# Patient Record
Sex: FEMALE | Race: WHITE | NOT HISPANIC OR LATINO | Employment: OTHER | URBAN - METROPOLITAN AREA
[De-identification: names, ages, dates, MRNs, and addresses within clinical notes are randomized per-mention and may not be internally consistent; named-entity substitution may affect disease eponyms.]

---

## 2017-01-03 ENCOUNTER — GENERIC CONVERSION - ENCOUNTER (OUTPATIENT)
Dept: OTHER | Facility: OTHER | Age: 77
End: 2017-01-03

## 2017-01-31 ENCOUNTER — ALLSCRIPTS OFFICE VISIT (OUTPATIENT)
Dept: OTHER | Facility: OTHER | Age: 77
End: 2017-01-31

## 2017-03-07 ENCOUNTER — ALLSCRIPTS OFFICE VISIT (OUTPATIENT)
Dept: OTHER | Facility: OTHER | Age: 77
End: 2017-03-07

## 2017-03-07 ENCOUNTER — HOSPITAL ENCOUNTER (OUTPATIENT)
Dept: RADIOLOGY | Facility: CLINIC | Age: 77
Discharge: HOME | End: 2017-03-07
Payer: MEDICARE

## 2017-03-07 ENCOUNTER — TRANSCRIBE ORDERS (OUTPATIENT)
Dept: ADMINISTRATIVE | Facility: HOSPITAL | Age: 77
End: 2017-03-07

## 2017-03-07 DIAGNOSIS — M54.50 LOW BACK PAIN: ICD-10-CM

## 2017-03-07 DIAGNOSIS — S32.010A CLOSED WEDGE COMPRESSION FRACTURE OF FIRST LUMBAR VERTEBRA (HCC): ICD-10-CM

## 2017-03-07 PROCEDURE — 72110 X-RAY EXAM L-2 SPINE 4/>VWS: CPT

## 2017-03-11 ENCOUNTER — GENERIC CONVERSION - ENCOUNTER (OUTPATIENT)
Dept: OTHER | Facility: OTHER | Age: 77
End: 2017-03-11

## 2017-03-28 ENCOUNTER — GENERIC CONVERSION - ENCOUNTER (OUTPATIENT)
Dept: OTHER | Facility: OTHER | Age: 77
End: 2017-03-28

## 2017-04-19 ENCOUNTER — GENERIC CONVERSION - ENCOUNTER (OUTPATIENT)
Dept: OTHER | Facility: OTHER | Age: 77
End: 2017-04-19

## 2017-05-01 ENCOUNTER — GENERIC CONVERSION - ENCOUNTER (OUTPATIENT)
Dept: OTHER | Facility: OTHER | Age: 77
End: 2017-05-01

## 2017-05-12 ENCOUNTER — GENERIC CONVERSION - ENCOUNTER (OUTPATIENT)
Dept: OTHER | Facility: OTHER | Age: 77
End: 2017-05-12

## 2017-05-26 ENCOUNTER — GENERIC CONVERSION - ENCOUNTER (OUTPATIENT)
Dept: OTHER | Facility: OTHER | Age: 77
End: 2017-05-26

## 2017-06-06 ENCOUNTER — GENERIC CONVERSION - ENCOUNTER (OUTPATIENT)
Dept: OTHER | Facility: OTHER | Age: 77
End: 2017-06-06

## 2017-07-10 DIAGNOSIS — M81.0 AGE-RELATED OSTEOPOROSIS WITHOUT CURRENT PATHOLOGICAL FRACTURE: ICD-10-CM

## 2017-07-21 ENCOUNTER — GENERIC CONVERSION - ENCOUNTER (OUTPATIENT)
Dept: OTHER | Facility: OTHER | Age: 77
End: 2017-07-21

## 2017-09-05 ENCOUNTER — GENERIC CONVERSION - ENCOUNTER (OUTPATIENT)
Dept: OTHER | Facility: OTHER | Age: 77
End: 2017-09-05

## 2017-10-03 ENCOUNTER — ALLSCRIPTS OFFICE VISIT (OUTPATIENT)
Dept: OTHER | Facility: OTHER | Age: 77
End: 2017-10-03

## 2017-10-03 ENCOUNTER — GENERIC CONVERSION - ENCOUNTER (OUTPATIENT)
Dept: OTHER | Facility: OTHER | Age: 77
End: 2017-10-03

## 2017-10-03 DIAGNOSIS — I10 ESSENTIAL (PRIMARY) HYPERTENSION: ICD-10-CM

## 2017-10-03 DIAGNOSIS — I73.9 PERIPHERAL VASCULAR DISEASE (HCC): ICD-10-CM

## 2017-10-03 DIAGNOSIS — R23.0 CYANOSIS: ICD-10-CM

## 2017-10-05 ENCOUNTER — LAB CONVERSION - ENCOUNTER (OUTPATIENT)
Dept: OTHER | Facility: OTHER | Age: 77
End: 2017-10-05

## 2017-10-05 LAB
A/G RATIO (HISTORICAL): 1.4 (CALC) (ref 1–2.5)
ALBUMIN SERPL BCP-MCNC: 4.4 G/DL (ref 3.6–5.1)
ALP SERPL-CCNC: 46 U/L (ref 33–130)
ALT SERPL W P-5'-P-CCNC: 9 U/L (ref 6–29)
AST SERPL W P-5'-P-CCNC: 19 U/L (ref 10–35)
BASOPHILS # BLD AUTO: 0.5 %
BASOPHILS # BLD AUTO: 30 CELLS/UL (ref 0–200)
BILIRUB SERPL-MCNC: 0.6 MG/DL (ref 0.2–1.2)
BUN SERPL-MCNC: 17 MG/DL (ref 7–25)
BUN/CREA RATIO (HISTORICAL): ABNORMAL (CALC) (ref 6–22)
CALCIUM SERPL-MCNC: 9.4 MG/DL (ref 8.6–10.4)
CHLORIDE SERPL-SCNC: 98 MMOL/L (ref 98–110)
CHOLEST SERPL-MCNC: 191 MG/DL
CHOLEST/HDLC SERPL: 2.4 (CALC)
CO2 SERPL-SCNC: 28 MMOL/L (ref 20–31)
CREAT SERPL-MCNC: 0.75 MG/DL (ref 0.6–0.93)
DEPRECATED RDW RBC AUTO: 12.9 % (ref 11–15)
EGFR AFRICAN AMERICAN (HISTORICAL): 88 ML/MIN/1.73M2
EGFR-AMERICAN CALC (HISTORICAL): 76 ML/MIN/1.73M2
EOSINOPHIL # BLD AUTO: 1.5 %
EOSINOPHIL # BLD AUTO: 90 CELLS/UL (ref 15–500)
FOLATE SERPL-MCNC: >24 NG/ML
GAMMA GLOBULIN (HISTORICAL): 3.1 G/DL (CALC) (ref 1.9–3.7)
GLUCOSE (HISTORICAL): 105 MG/DL (ref 65–99)
HBA1C MFR BLD HPLC: 5.6 % OF TOTAL HGB
HCT VFR BLD AUTO: 37.7 % (ref 35–45)
HDLC SERPL-MCNC: 80 MG/DL
HGB BLD-MCNC: 12.7 G/DL (ref 11.7–15.5)
LDL CHOLESTEROL (HISTORICAL): 93 MG/DL (CALC)
LDL CHOLESTEROL DIRECT (HISTORICAL): 82 MG/DL
LYMPHOCYTES # BLD AUTO: 1038 CELLS/UL (ref 850–3900)
LYMPHOCYTES # BLD AUTO: 17.3 %
MCH RBC QN AUTO: 30.4 PG (ref 27–33)
MCHC RBC AUTO-ENTMCNC: 33.7 G/DL (ref 32–36)
MCV RBC AUTO: 90.2 FL (ref 80–100)
MONOCYTES # BLD AUTO: 504 CELLS/UL (ref 200–950)
MONOCYTES (HISTORICAL): 8.4 %
NEUTROPHILS # BLD AUTO: 4338 CELLS/UL (ref 1500–7800)
NEUTROPHILS # BLD AUTO: 72.3 %
NON-HDL-CHOL (CHOL-HDL) (HISTORICAL): 111 MG/DL (CALC)
PLATELET # BLD AUTO: 264 THOUSAND/UL (ref 140–400)
PMV BLD AUTO: 10 FL (ref 7.5–12.5)
POTASSIUM SERPL-SCNC: 4 MMOL/L (ref 3.5–5.3)
RBC # BLD AUTO: 4.18 MILLION/UL (ref 3.8–5.1)
SODIUM SERPL-SCNC: 134 MMOL/L (ref 135–146)
T4 FREE SERPL-MCNC: 1.5 NG/DL (ref 0.8–1.8)
TOTAL PROTEIN (HISTORICAL): 7.5 G/DL (ref 6.1–8.1)
TRIGL SERPL-MCNC: 86 MG/DL
TSH SERPL DL<=0.05 MIU/L-ACNC: 1.41 MIU/L (ref 0.4–4.5)
VIT B12 SERPL-MCNC: 693 PG/ML (ref 200–1100)
WBC # BLD AUTO: 6 THOUSAND/UL (ref 3.8–10.8)

## 2017-11-02 ENCOUNTER — GENERIC CONVERSION - ENCOUNTER (OUTPATIENT)
Dept: OTHER | Facility: OTHER | Age: 77
End: 2017-11-02

## 2018-01-01 ENCOUNTER — TELEPHONE (OUTPATIENT)
Dept: FAMILY MEDICINE | Facility: CLINIC | Age: 78
End: 2018-01-01

## 2018-01-01 ENCOUNTER — OFFICE VISIT (OUTPATIENT)
Dept: FAMILY MEDICINE | Facility: CLINIC | Age: 78
End: 2018-01-01
Payer: MEDICARE

## 2018-01-01 ENCOUNTER — PATIENT OUTREACH (OUTPATIENT)
Dept: CASE MANAGEMENT | Facility: CLINIC | Age: 78
End: 2018-01-01

## 2018-01-01 VITALS
TEMPERATURE: 98.1 F | BODY MASS INDEX: 21.01 KG/M2 | HEART RATE: 72 BPM | DIASTOLIC BLOOD PRESSURE: 64 MMHG | OXYGEN SATURATION: 95 % | WEIGHT: 104 LBS | SYSTOLIC BLOOD PRESSURE: 128 MMHG

## 2018-01-01 VITALS
SYSTOLIC BLOOD PRESSURE: 160 MMHG | TEMPERATURE: 98.1 F | WEIGHT: 105 LBS | DIASTOLIC BLOOD PRESSURE: 70 MMHG | BODY MASS INDEX: 21.21 KG/M2 | OXYGEN SATURATION: 92 %

## 2018-01-01 VITALS
OXYGEN SATURATION: 98 % | SYSTOLIC BLOOD PRESSURE: 142 MMHG | TEMPERATURE: 98.1 F | HEIGHT: 59 IN | BODY MASS INDEX: 21.17 KG/M2 | HEART RATE: 79 BPM | DIASTOLIC BLOOD PRESSURE: 70 MMHG | WEIGHT: 105 LBS

## 2018-01-01 DIAGNOSIS — Z09 HOSPITAL DISCHARGE FOLLOW-UP: Primary | ICD-10-CM

## 2018-01-01 DIAGNOSIS — F32.89 OTHER DEPRESSION: Primary | ICD-10-CM

## 2018-01-01 DIAGNOSIS — R26.89 BALANCE DISORDER: ICD-10-CM

## 2018-01-01 DIAGNOSIS — Z87.81 HISTORY OF RIB FRACTURE: ICD-10-CM

## 2018-01-01 DIAGNOSIS — G20.A1 PARKINSON'S DISEASE (CMS/HCC): ICD-10-CM

## 2018-01-01 DIAGNOSIS — R41.3 MEMORY LOSS: ICD-10-CM

## 2018-01-01 DIAGNOSIS — R60.0 LOWER EXTREMITY EDEMA: ICD-10-CM

## 2018-01-01 DIAGNOSIS — I10 ESSENTIAL HYPERTENSION: ICD-10-CM

## 2018-01-01 DIAGNOSIS — S27.0XXA TRAUMATIC PNEUMOTHORAX, INITIAL ENCOUNTER: ICD-10-CM

## 2018-01-01 DIAGNOSIS — R41.0 CONFUSION: Primary | ICD-10-CM

## 2018-01-01 DIAGNOSIS — R06.02 SOB (SHORTNESS OF BREATH): ICD-10-CM

## 2018-01-01 PROCEDURE — 99202 OFFICE O/P NEW SF 15 MIN: CPT | Performed by: FAMILY MEDICINE

## 2018-01-01 PROCEDURE — 99213 OFFICE O/P EST LOW 20 MIN: CPT | Performed by: FAMILY MEDICINE

## 2018-01-01 PROCEDURE — 99495 TRANSJ CARE MGMT MOD F2F 14D: CPT | Performed by: FAMILY MEDICINE

## 2018-01-01 RX ORDER — BUPROPION HYDROCHLORIDE 150 MG/1
150 TABLET, EXTENDED RELEASE ORAL
COMMUNITY
End: 2018-01-01 | Stop reason: SDUPTHER

## 2018-01-01 RX ORDER — ESCITALOPRAM OXALATE 10 MG/1
20 TABLET ORAL
COMMUNITY
Start: 2017-01-31 | End: 2018-01-01 | Stop reason: SDUPTHER

## 2018-01-01 RX ORDER — CARBIDOPA AND LEVODOPA 25; 100 MG/1; MG/1
1 TABLET, EXTENDED RELEASE ORAL
COMMUNITY
End: 2018-01-01 | Stop reason: SDUPTHER

## 2018-01-01 RX ORDER — PSYLLIUM HUSK 0.4 G
CAPSULE ORAL DAILY
COMMUNITY

## 2018-01-01 RX ORDER — ATENOLOL 25 MG/1
1 TABLET ORAL
COMMUNITY

## 2018-01-01 RX ORDER — ESCITALOPRAM OXALATE 20 MG/1
20 TABLET ORAL DAILY
Start: 2018-01-01

## 2018-01-01 RX ORDER — CHOLECALCIFEROL (VITAMIN D3) 25 MCG
1000 TABLET ORAL DAILY
COMMUNITY

## 2018-01-01 RX ORDER — BUPROPION HYDROCHLORIDE 200 MG/1
200 TABLET, EXTENDED RELEASE ORAL DAILY
Start: 2018-01-01

## 2018-01-01 RX ORDER — MEMANTINE HYDROCHLORIDE 10 MG/1
1 TABLET ORAL
COMMUNITY
Start: 2016-08-29

## 2018-01-01 RX ORDER — CARBIDOPA AND LEVODOPA 25; 100 MG/1; MG/1
1 TABLET, EXTENDED RELEASE ORAL 4 TIMES DAILY
Start: 2018-01-01

## 2018-01-01 ASSESSMENT — ENCOUNTER SYMPTOMS
SHORTNESS OF BREATH: 1
APPETITE CHANGE: 0
DIARRHEA: 0
BACK PAIN: 0
NERVOUS/ANXIOUS: 0
APPETITE CHANGE: 0
DIZZINESS: 0
ACTIVITY CHANGE: 0
FATIGUE: 1
PALPITATIONS: 0
COUGH: 0
WEAKNESS: 1
ABDOMINAL PAIN: 0
PALPITATIONS: 0
ABDOMINAL DISTENTION: 0
FATIGUE: 1
WHEEZING: 0
FREQUENCY: 0
COUGH: 0
DIZZINESS: 0
SHORTNESS OF BREATH: 0
SLEEP DISTURBANCE: 0
NUMBNESS: 0
DIARRHEA: 0
VOMITING: 0
VOMITING: 0
CONSTIPATION: 0
COUGH: 0
CONSTIPATION: 0
ABDOMINAL DISTENTION: 0
NAUSEA: 0
WEAKNESS: 0
DIZZINESS: 0
FATIGUE: 1
LIGHT-HEADEDNESS: 0
APPETITE CHANGE: 0
DIARRHEA: 0
CONFUSION: 1
FREQUENCY: 0
ACTIVITY CHANGE: 0
NAUSEA: 0
HEADACHES: 0
WHEEZING: 1
ABDOMINAL PAIN: 0
LIGHT-HEADEDNESS: 0
SLEEP DISTURBANCE: 0
NUMBNESS: 0
WHEEZING: 0
DYSPHORIC MOOD: 1
DIFFICULTY URINATING: 0
DIFFICULTY URINATING: 0
SHORTNESS OF BREATH: 0
VOMITING: 0
ABDOMINAL PAIN: 0
NAUSEA: 0

## 2018-01-09 NOTE — PROGRESS NOTES
Assessment    1  Essential hypertension (401 9) (I10)   2  Lyme disease (088 81) (A69 20)   3  GERD (gastroesophageal reflux disease) (530 81) (K21 9)   4  Memory loss (780 93) (R41 3)   5  Parkinson's disease (332 0) (G20)   6  Lightheadedness (780 4) (R42)    Plan  Essential hypertension    · Follow-up visit in 2 months Evaluation and Treatment  Follow-up  Status: Hold For -  Scheduling  Requested for: 70MQP5450  Lightheadedness, Memory loss, Parkinson's disease    · * MRI BRAIN W WO CONTRAST; Status:Active; Requested for:08Mar2016;    · VAS CAROTID COMPLETE STUDY; SIDE:Bilateral; Status:Active; Requested  for:08Mar2016;   Lyme disease    · Doxycycline Hyclate 100 MG Oral Capsule; Take 1 capsule twice daily   · Epi - Mark Vo MD, Ruperto Perez  (Internal Medicine) Physician Referral  Consult  Status: Active   Requested for: 87FBC2700  are Referring to a non-SL Preferred Provider : Quality  Care Summary provided  : Yes  Memory loss    · Namenda Titration Chriss 5 (28)-10 (21) MG Oral Tablet; Take as directed    Discussion/Summary    The patient's Lyme titer was highly positive consistent with chronic Lyme  I will restart her on the doxycycline as ordered to help with her symptoms  I am going to refer her to [facility] infectious disease for further evaluation and treatment  We will monitor her very closely  In regards to her memory loss and lightheadedness, I will send her for the MRI and the carotid ultrasound was ordered  She'll follow up with her neurologist as scheduled  We will monitor her very closely and we'll see her back as scheduled  Possible side effects of new medications were reviewed with the patient/guardian today  The treatment plan was reviewed with the patient/guardian  The patient/guardian understands and agrees with the treatment plan      Chief Complaint  patient follow up visit, she states she is still feeling bad and would like to discuss her symptoms        History of Present Illness  Ab Murcia is a 66 y/o female who presents for a follow up of her chronic conditions  She states she is having lightheadedness and nausea this am  She has been feeling lightheaded for 2 -3 weeks  There are no fevers or chills  There are no headaches  There are no palpitations  There is no edema  She is seeing cardiology next week  She is seeing the Neurologist in a few months  She is taking the Pantaprozole for her GERD and it is working well  Her last IV infusion of antibiotics was years ago for Lyme  There is no joint pain and swelling  There are no rashes  There are no headaches  There is no spinning sensation and it does not feel like the room is spinning  There is no blood in her stool  There is no relation to change in position  There is no shortness of breath  There is discomfort when she breathes out in the midepigastric area for a few months  He denies any joint pain or swelling  She denies any fevers  Symptoms:  general malaise, fatigue and myalgias, but no localized rash, no arthralgias, no joint swelling, no fever, no headache and no facial drooping  Associated symptoms:  memory loss, but no mood changes, no confusion, no localized weakness, no paresthesias, no shortness of breath, no palpitations, no syncope, no chest pain, no testicular pain and no inflammation of the eyes  The patient is being seen for a routine clinic follow-up of Parkinson's disease  Symptoms:  tremor at rest, pill-rolling hand tremor and gait disturbance, but no frequent falls, no postural instability, no decreased blinking, no bradykinesia, no paucity of movement, no akathisia, no limb clumsiness, no decreased facial expression, no altered speech and no worsening handwriting  The patient is being seen for follow-up of gastroesophageal reflux disease  The patient reports doing well  There are no comorbid illnesses  She has had no significant interval events  The patient is currently asymptomatic     The patient is being seen for follow-up of memory loss  The patient reports no change in the condition  She has had no significant interval events  Interval symptoms:  stable memory loss, stable difficulty performing familiar tasks, stable confusion, stable agitation, stable aggressiveness, stable difficulty concentrating, stable impaired judgment and stable   The patient presents for follow-up of essential hypertension  The patient states she has been doing well with her blood pressure control since the last visit  She has no comorbid illnesses  She has no significant interval events  Symptoms: The patient is currently asymptomatic  Review of Systems    Constitutional: as noted in HPI  Eyes: as noted in HPI    ENT: as noted in HPI  Cardiovascular: as noted in HPI  Respiratory: as noted in HPI  Gastrointestinal: as noted in HPI  Genitourinary: as noted in HPI  Musculoskeletal: as noted in HPI  Integumentary: as noted in HPI  Neurological: as noted in HPI  Psychiatric: as noted in HPI  Active Problems    1  Acid reflux (530 81) (K21 9)   2  Cerebrovascular disease (437 9) (I67 9)   3  Chronic fatigue (780 79) (R53 82)   4  Dizziness and giddiness (780 4) (R42)   5  Essential hypertension (401 9) (I10)   6  Lightheadedness (780 4) (R42)   7  Lyme disease (088 81) (A69 20)   8  Memory loss (780 93) (R41 3)   9  Palpitations (785 1) (R00 2)   10  Parkinson's disease (332 0) (G20)   11  Screening for breast cancer (V76 10) (Z12 39)   12  Screening for colon cancer (V76 51) (Z12 11)    Past Medical History    1  History of benign neoplasm of kidney, 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)   2  History of carcinoma in situ of cervix (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 89) (Z86 008)   3  History of depression (V11 8) (Z86 59)   4  History of hypoglycemia (V12 29) (Z86 39)   5  History of irritable bowel syndrome (V12 79) (Z87 19)   6  History of osteoporosis (V13 59) (Z87 39)   7  History of Lung nodule (793 11) (R91 1)   8  History of Palpitations (785 1) (R00 2)   9  History of Vitamin D deficiency (268 9) (E55 9)    The active problems and past medical history were reviewed and updated today  Surgical History    1  History of Cholecystectomy   2  History of Hysterectomy    The surgical history was reviewed and updated today  Family History    1  No pertinent family history    2  Family history of myocardial infarction (V17 3) (Z82 49)    3  Family history of Adult hypothyroidism    4  Family history of Bilateral malignant neoplasm of breast in female, unspecified site of   breast    The family history was reviewed and updated today  Social History    ·    · Never a smoker   · No alcohol use   · No drug use   · Three children  The social history was reviewed and updated today  The social history was reviewed and is unchanged  Current Meds   1  Atenolol 25 MG Oral Tablet; TAKE 1 TABLET 3 times daily; Therapy: (Recorded:67Dlr8555) to Recorded   2  Carbidopa-Levodopa ER  MG Oral Tablet Extended Release; TAKE 1 TABLET 4   TIMES DAILY; Therapy: (Recorded:08Mar2016) to Recorded   3  ClonazePAM 0 5 MG Oral Tablet; TAKE 1 TABLET Daily PRN anxiety; Therapy: (Recorded:50Uyl6376) to Recorded   4  Pantoprazole Sodium TBEC; Take 1 tablet daily; Therapy: (Recorded:72Sex1951) to Recorded    The medication list was reviewed and updated today  Allergies    1  No Known Drug Allergies    Vitals  Vital Signs [Data Includes: Current Encounter]    Recorded: 94CWN6839 11:39AM   Temperature 97 7 F, Tympanic   Heart Rate 66   Systolic 107, RUE, Sitting   Diastolic 80, RUE, Sitting   Height 5 ft 1 in   Weight 115 lb 7 04 oz   BMI Calculated 21 81   BSA Calculated 1 49     Physical Exam    Constitutional   General appearance: No acute distress, well appearing and well nourished  Eyes   Conjunctiva and lids: No swelling, erythema or discharge  Pupils and irises: Equal, round and reactive to light      Ears, Nose, Mouth, and Throat   External inspection of ears and nose: Normal     Otoscopic examination: Tympanic membranes translucent with normal light reflex  Canals patent without erythema  Nasal mucosa, septum, and turbinates: Normal without edema or erythema  Oropharynx: Normal with no erythema, edema, exudate or lesions  Pulmonary   Respiratory effort: No increased work of breathing or signs of respiratory distress  Auscultation of lungs: Clear to auscultation  Cardiovascular   Palpation of heart: Normal PMI, no thrills  Auscultation of heart: Normal rate and rhythm, normal S1 and S2, without murmurs  Examination of extremities for edema and/or varicosities: Normal     Carotid pulses: Normal     Abdomen   Abdomen: Non-tender, no masses  Liver and spleen: No hepatomegaly or splenomegaly  Lymphatic   Palpation of lymph nodes in neck: No lymphadenopathy  Musculoskeletal   Gait and station: Normal     Digits and nails: Normal without clubbing or cyanosis  Inspection/palpation of joints, bones, and muscles: Normal     Skin   Skin and subcutaneous tissue: Normal without rashes or lesions  Neurologic   Cranial nerves: Cranial nerves 2-12 intact  Reflexes: 2+ and symmetric  Sensation: No sensory loss  Psychiatric   Orientation to person, place, and time: Normal     Mood and affect: Normal          Health Management  Screening for colon cancer   COLONOSCOPY; every 5 years; Last 29HOX2143; Next Due: 49QZB1053;  Overdue    Signatures   Electronically signed by : David Johnson DO; Mar  8 2016  9:32PM EST                       (Author)

## 2018-01-10 NOTE — MISCELLANEOUS
Message   Recorded as Task   Date: 03/25/2016 08:55 AM, Created By: Oh Dang   Task Name: Go to Note   Assigned To: Zaki Hayeseste   Regarding Patient: Arlyn Ray, Status: Active   Comment:    Oh Dang - 25 Mar 2016 8:55 AM     TASK CREATED  Let Dasia know that there were no acute findings on the MRI  There are no brain lesions  They saw evidnce of s skin lesion on the posterior scalp that my be nothing, but I should examine this in the office  Please see if she could come in next week so I can check this    03/25/2016 Spoke to patient and advised to schedule an appointment  trb      Active Problems    1  Acid reflux (530 81) (K21 9)   2  Cerebrovascular disease (437 9) (I67 9)   3  Chronic fatigue (780 79) (R53 82)   4  Dizziness and giddiness (780 4) (R42)   5  Essential hypertension (401 9) (I10)   6  GERD (gastroesophageal reflux disease) (530 81) (K21 9)   7  Lightheadedness (780 4) (R42)   8  Lyme disease (088 81) (A69 20)   9  Memory loss (780 93) (R41 3)   10  Osteoporosis (733 00) (M81 0)   11  Palpitations (785 1) (R00 2)   12  Parkinson's disease (332 0) (G20)    Current Meds   1  Atenolol 25 MG Oral Tablet; TAKE 1 TABLET 3 times daily; Therapy: (Recorded:70Bym9961) to Recorded   2  Carbidopa-Levodopa ER  MG Oral Tablet Extended Release; TAKE 1 TABLET 4   TIMES DAILY; Therapy: (Recorded:08Mar2016) to Recorded   3  ClonazePAM 0 5 MG Oral Tablet; TAKE 1 TABLET Daily PRN anxiety; Therapy: (Recorded:38Rvj3109) to Recorded   4  Doxycycline Hyclate 100 MG Oral Capsule; Take 1 capsule twice daily; Therapy: 53QDL7787 to (Laurent Jaquez)  Requested for: 30CDZ3997; Last   Rx:08Mar2016 Ordered   5  Namenda Titration Chriss 5 (28)-10 (21) MG Oral Tablet; Take as directed; Therapy: 07WPK4095 to (Evaluate:05Apr2016); Last Rx:08Mar2016 Ordered   6  Pantoprazole Sodium TBEC; Take 1 tablet daily; Therapy: (Recorded:77Rig1621) to Recorded    Allergies    1   No Known Drug Allergies    Signatures Electronically signed by :  Rhea Palmer, ; Mar 25 2016  2:33PM EST                       (Author)

## 2018-01-10 NOTE — MISCELLANEOUS
Message   Recorded as Task   Date: 09/06/2016 03:23 PM, Created By: Praveen Valenzuela   Task Name: Care Coordination   Assigned To: Anna Morrison   Regarding Patient: Donell Caballero, Status: Active   CommentLenita Pica - 06 Sep 2016 3:23 PM     TASK CREATED  Caller: NAOMI, PT FROM [place], Other; Care Coordination; [phone]  NAOMI FROM [place] PHYSICAL [facility]ERAPY CALLED  IS DISCHARGING PT FROM THERAPY TODAY  DID MAKE SOME IMPROVEMENT WITH BALANCE, BUT NO IMPROVEMENT FOR LIGHT HEADEDNESS   THERAPY WAS LIMITED DUE TO LIGHT HEADEDNESS  CAN CALL NAOMI [phone] WITH ANY QUESTIONS   Priscilla Caldwell - 06 Sep 2016 4:50 PM     TASK REASSIGNED: Previously Assigned To Sathish Mancera      I would recommend that the patient should follow up with her Cardiologist about her lightheadedness- she should call and schedule a follow up with them- she may need her meds adjusted  Anna Morrison - 06 Sep 2016 7:55 PM     TASK EDITED  Patient advised  PLM        Active Problems    1  Acid reflux (530 81) (K21 9)   2  Cerebrovascular disease (437 9) (I67 9)   3  Chronic fatigue (780 79) (R53 82)   4  Dizziness and giddiness (780 4) (R42)   5  Essential hypertension (401 9) (I10)   6  Gait instability (781 2) (R26 81)   7  GERD (gastroesophageal reflux disease) (530 81) (K21 9)   8  Lightheadedness (780 4) (R42)   9  Lipoma of scalp (214 1) (D17 0)   10  Lyme disease (088 81) (A69 20)   11  Memory loss (780 93) (R41 3)   12  Muscular deconditioning (781 99) (R29 898)   13  Osteoporosis (733 00) (M81 0)   14  Palpitations (785 1) (R00 2)   15  Parkinson's disease (332 0) (G20)    Current Meds   1  Atenolol 25 MG Oral Tablet; TAKE 1 TABLET 3 times daily; Therapy: (Recorded:22Rce0539) to Recorded   2  Carbidopa-Levodopa ER  MG Oral Tablet Extended Release; TAKE 1 TABLET 4   TIMES DAILY; Therapy: (Recorded:08Mar2016) to Recorded   3  ClonazePAM 0 5 MG Oral Tablet; TAKE 1 TABLET Daily PRN anxiety;    Therapy: (Recorded:74Vke9529) to Recorded   4  ClonazePAM 1 MG Oral Tablet; Therapy: 22JRI4889 to (Evaluate:08Sep2016) Recorded   5  Memantine HCl - 5 MG Oral Tablet; 2 IN THE AM Recorded   6  Vitamin D CAPS; One daily; Therapy: (Recorded:10Jun2016) to Recorded   7  Zoledronic Acid 5 MG/100ML Intravenous Solution (Reclast); INFUSE 5MG   INTRAVENOUSLY OVER 15 MINUTES AS DIRECTED once yearly; Therapy: 01MNI8149 to (Evaluate:11Jun2016); Last Rx:10Jun2016 Ordered    Allergies    1   No Known Drug Allergies    Signatures   Electronically signed by : Mahesh Conrad, ; Sep  6 2016  7:55PM EST                       (Author)

## 2018-01-11 NOTE — PROGRESS NOTES
Assessment    1  Encounter for preventive health examination (V70 0) (Z00 00)    Plan  Depression    · BuPROPion HCl ER (XL) 150 MG Oral Tablet Extended Release 24 Hour  (Wellbutrin XL); TAKE 1 TABLET DAILY AS DIRECTED   · Follow-up visit in 1 month Evaluation and Treatment  Follow-up  Status: Complete  Done:  [id number]  Encounter for screening mammogram for breast cancer    · * MAMMO SCREENING BILATERAL W CAD; Status:Active; Requested for:55Igt6342; Health Maintenance    · Begin a limited exercise program ; Status:Complete;   Done: 63AEO4481   · Eat a low fat and low cholesterol diet ; Status:Complete;   Done: 93UYC9123   · Eat no more than 30 grams of fat per day ; Status:Complete;   Done: 99GTQ5341   · Stretch and warm up your muscles during the first 10 minutes , then cool down your  muscles for the last 10 minutes of exercise ; Status:Complete;   Done: 28EDU6238   · There are many exercise options for seniors ; Status:Complete;   Done: 16HSQ5950   · There ways to avoid falling ; Status:Complete;   Done: 10NMW9139   · These are things you can do to prevent falls in and around the home ; Status:Complete;    Done: 84TGS8589   · We encourage all of our patients to exercise regularly    30 minutes of exercise or physical  activity five or more days a week is recommended for children and adults ;  Status:Complete;   Done: 82KPV5463  Need for vaccination with 13-polyvalent pneumococcal conjugate vaccine    · Stop: Prevnar 13 Intramuscular Suspension  Need for vaccination with 13-polyvalent pneumococcal conjugate vaccine, Need for  Zostavax administration    · Stop: Zostavax 03743 UNT/0 65ML Subcutaneous Solution Reconstituted  Needs flu shot    · Stop: Fluzone High-Dose 0 5 ML Intramuscular Suspension Prefilled  Syringe  Palpitations    · From  Atenolol 25 MG Oral Tablet TAKE 1 TABLET 3 times daily To Atenolol 25  MG Oral Tablet TAKE 1 TABLET TWICE DAILY  Screening for colon cancer    · COLONOSCOPY; Status:Active; Requested for:24Ihx4574;   Screening for genitourinary condition    · *VB - Urinary Incontinence Screen (Dx V81 6 Screen for UI); Status:Complete;   Done:  49QJV7733 07:56PM    Discussion/Summary    Dasia's blood work was within normal limits  She'll follow-up with cardiology as scheduled later this week  She still having a lot of fatigue and lightheadedness  I believe this could be partially related to her medication  Her atenolol could be contributing to this  I advised her to decrease her atenolol to 25 mg twice a day and monitor her symptoms  In addition, she is having symptoms suggestive of depression  We will start her on the Wellbutrin  mg once daily to see if this helps with her symptoms  We will monitor her closely and we'll see her back again in a month  Impression: Initial Annual Wellness Visit, with preventive exam as well as age and risk appropriate counseling completed  Cardiovascular screening and counseling: the risks and benefits of screening were discussed and screening is current  Diabetes screening and counseling: the risks and benefits of screening were discussed and screening is current  Colorectal cancer screening and counseling: the risks and benefits of screening were discussed and due for a colonoscopy (low risk)  Breast cancer screening and counseling: the risks and benefits of screening were discussed and due for a screening mammogram    Cervical cancer screening and counseling: the risks and benefits of screening were discussed and screening not indicated  Osteoporosis screening and counseling: the risks and benefits of screening were discussed and screening is current  Abdominal aortic aneurysm screening and counseling: screening not indicated  Glaucoma screening and counseling: the risks and benefits of screening were discussed and screening is current  HIV screening and counseling: screening not indicated   Immunizations: the risks and benefits of influenza vaccination were discussed with the patient, the patient declines the influenza vaccination, the risks and benefits of pneumococcal vaccination were discussed with the patient, the patient declines the pneumococcal vaccination, hepatitis B vaccination series is not indicated at this time due to the patient's low risk of myrna the disease, the risks and benefits of the Zostavax vaccine were discussed with the patient, the patient declines the Zostavax vaccine, the risks and benefits of the Td vaccine were discussed with the patient, the patient declines the Td vaccine, the risks and benefits of the Tdap vaccine were discussed with the patient and the patient declines the Tdap vaccine  Advance Directive Planning: complete and up to date, The patient will bring in a copy of her advanced directive  Chief Complaint  Physical Exam, 68 yo  Due for mammogram, colonoscopy, vaccines, UI assessment, physical activity counseling  Advance Directives  Advance Directive [facility]:   The patient is not in agreement to receive the Advance Care Planning service    YES - Patient has an advance health care directive  The patient has a living will located  in patient's home  Capacity/Competence: This patient has full decision making capacity for discussion of advance care planning and This patient has full decision making competency for discussion of advance care planning  History of Present Illness  HPI: Robert Sapp is a 66 y/o female who presents for a Medicare Wellness visit  She is stable today  She needs to schedule an appointment with Dr Leo Eric for her Reclast infusion  She is seeing her Yue Kingk later this week  She sees the Neurologist soon  She is still very tired and she has difficulty exercising due to fatigue  She has felt this way for several years  The patient denies any chest pain, shortness of breath, or palpitations  There is no edema  There are no headaches or visual changes   There is no lightheadedness, dizziness, or fainting spells  There are no palpitations  There is frequent nausea, no vomiting  There is no heartburn  Has been having increasing depression symptoms for months  She feels sad all the time and is losing interest in activities  She denies any suicidal ideations  She sleeps all the time and has no motivation  Welcome to [employer] and Wellness Visits: The patient is being seen for the initial annual wellness visit  Medicare Screening and Risk Factors   Hospitalizations: no previous hospitalizations and she has been hospitalized 0 times  Once per lifetime medicare screening tests: ECG (sees Cardiology)  Medicare Screening Tests Risk Questions   Abdominal aortic aneurysm risk assessment: none indicated  Osteoporosis risk assessment: , female gender, over 48years of age and low calcium diet  HIV risk assessment: none indicated  Drug and Alcohol Use: The patient has never smoked cigarettes  The patient reports never drinking alcohol  She has never used illicit drugs  Diet and Physical Activity: Current diet includes well balanced meals, limited junk food, 1-2 servings of fruit per day, 2-3 servings of vegetables per day, 1 servings of meat per day, 1-2 servings of whole grains per day, 1 servings of simple carbohydrates per day, 1-2 servings of dairy products per day, 0-1 cups of coffee per day, 0-1 cups of tea per day, 0 cans of regular soda per day and 0 cans of diet soda per day  She exercises infrequently  Exercise: walking  Mood Disorder and Cognitive Impairment Screening: PHQ-9 Depression Scale and clinically significant symptoms She reports feeling down, depressed, or hopeless over the past two weeks  She reports feeling little interest or pleasure in doing things over the past two weeks     Cognitive impairment screening: denies difficulty learning/retaining new information, denies difficulty handling complex tasks, denies difficulty with reasoning, denies difficulty with spatial ability and orientation, denies difficulty with language and denies difficulty with behavior  Functional Ability/Level of Safety: Hearing is normal bilaterally and a hearing aid is not used  She denies hearing difficulties  The patient is currently able to do activities of daily living without limitations, able to do instrumental activities of daily living without limitations, able to participate in social activities without limitations and able to drive without limitations  Activities of daily living details: does not need help using the phone, no transportation help needed, does not need help shopping, no meal preparation help needed, does not need help doing housework, does not need help doing laundry, does not need help managing medications and does not need help managing money  Fall risk factors:  polypharmacy, mobility impairment, deconditioning, antihypertensive use and up and go test was unsteady or greater than thirty seconds, but The patient fell 0 times in the past 12 months , no alcohol use, no antidepressant use, no postural hypotension, no sedative use, no visual impairment, no urinary incontinence, no cognitive impairment and no previous fall  Home safety risk factors:  no grab bars in the bathroom, but no unfamiliar surroundings, no loose rugs, no poor household lighting, no uneven floors, no household clutter and handrails on the stairs  Advance Directives: Advance directives: living will, durable power of  for health care directives and advance directives  end of life decisions were reviewed with the patient and I agree with the patient's decisions     Co-Managers and Medical Equipment/Suppliers: See Patient Care Team      Patient Care Team    Care Team Member Role Specialty Office Number   [place], [place]  Cardiology    Jackson JASSO MD  Rheumatology [phone]   [facility] Caldwell [facility] [phone]     Review of Systems    Constitutional: as noted in HPI  Head and Face: as noted in HPI  Eyes: as noted in HPI    ENT: as noted in HPI  Cardiovascular: as noted in HPI  Respiratory: as noted in HPI  Gastrointestinal: as noted in HPI  Genitourinary: as noted in HPI  Musculoskeletal: as noted in HPI  Integumentary and Breasts: as noted in HPI  Active Problems    1  Acid reflux (530 81) (K21 9)   2  Cerebrovascular disease (437 9) (I67 9)   3  Chronic fatigue (780 79) (R53 82)   4  Dizziness and giddiness (780 4) (R42)   5  Essential hypertension (401 9) (I10)   6  Gait instability (781 2) (R26 81)   7  GERD (gastroesophageal reflux disease) (530 81) (K21 9)   8  Lightheadedness (780 4) (R42)   9  Lipoma of scalp (214 1) (D17 0)   10  Lyme disease (088 81) (A69 20)   11  Memory loss (780 93) (R41 3)   12  Muscular deconditioning (781 99) (R29 898)   13  Osteoporosis (733 00) (M81 0)   14  Palpitations (785 1) (R00 2)   15  Parkinson's disease (332 0) (G20)    Past Medical History    · History of benign neoplasm of kidney, 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)   · History of carcinoma in situ of cervix (V13 89) (Z86 008)   · History of hypoglycemia (V12 29) (Z86 39)   · History of irritable bowel syndrome (V12 79) (Z87 19)   · History of Lung nodule (793 11) (R91 1)   · History of Palpitations (785 1) (R00 2)   · Screening for breast cancer (V76 10) (Z12 39)   · Screening for colon cancer (V76 51) (Z12 11)   · History of Vitamin D deficiency (268 9) (E55 9)    Surgical History    · History of Cholecystectomy   · History of Hysterectomy    The surgical history was reviewed and updated today         Family History  Mother    · No pertinent family history  Father    · Family history of myocardial infarction (V17 3) (Z80 55)  Daughter    · Family history of Adult hypothyroidism  Sister    · Family history of Bilateral malignant neoplasm of breast in female, unspecified site of  breast  Brother    · Family history of cerebral hemorrhage (V17 2) (Z82 0)    The family history was reviewed and updated today  Social History    ·    · Never a smoker   · No alcohol use   · No drug use   · Three children  The social history was reviewed and updated today  The social history was reviewed and is unchanged  Current Meds   1  Atenolol 25 MG Oral Tablet; TAKE 1 TABLET 3 times daily; Therapy: (Recorded:25Feb2016) to Recorded   2  Carbidopa-Levodopa ER  MG Oral Tablet Extended Release; TAKE 1 TABLET 4   TIMES DAILY; Therapy: (Recorded:08Mar2016) to Recorded   3  ClonazePAM 0 5 MG Oral Tablet; TAKE 1 TABLET Daily PRN anxiety; Therapy: (Recorded:25Feb2016) to Recorded   4  Memantine HCl - 10 MG Oral Tablet; Therapy: 13Lgi8590 to (Evaluate:13Oct2016) Recorded   5  Vitamin D CAPS; One daily; Therapy: (Recorded:10Jun2016) to Recorded   6  Zoledronic Acid 5 MG/100ML Intravenous Solution; INFUSE 5MG INTRAVENOUSLY   OVER 15 MINUTES AS DIRECTED once yearly; Therapy: 95HFO1754 to (Evaluate:11Jun2016); Last Rx:10Jun2016 Ordered    Allergies    1  No Known Drug Allergies    Vitals  Signs    Systolic: 306, LUE, Sitting  Diastolic: 80, LUE, Sitting  Heart Rate: 62  Respiration: 11  Temperature: 96 8 F, Tympanic  Height: 5 ft 1 in  Weight: 112 lb 4 00 oz  BMI Calculated: 21 21  BSA Calculated: 1 47    Physical Exam    Constitutional   General appearance: No acute distress, well appearing and well nourished  Head and Face   Head and face: Normal     Palpation of the face and sinuses: No sinus tenderness  Eyes   Pupils and irises: Equal, round, reactive to light  Ophthalmoscopic examination: Normal fundi and optic discs  Ears, Nose, Mouth, and Throat   Otoscopic examination: Tympanic membranes translucent with normal light reflex  Canals patent without erythema  Hearing: Normal     Nasal mucosa, septum, and turbinates: Normal without edema or erythema  Lips, teeth, and gums: Normal, good dentition  Oropharynx: Normal with no erythema, edema, exudate or lesions  Neck   Neck: Supple, symmetric, trachea midline, no masses  Thyroid: Normal, no thyromegaly  Pulmonary   Percussion of chest: Normal     Palpation of chest: Normal     Auscultation of lungs: Clear to auscultation  Auscultation of the lungs revealed no expiratory wheezing, normal expiratory time and no inspiratory wheezing  no rales or crackles were heard bilaterally  no rhonchi  no friction rub  no wheezing  no diminished breath sounds  no bronchial breath sounds  Cardiovascular   Auscultation of heart: Normal rate and rhythm, normal S1 and S2, no murmurs  The heart rate was normal  Heart sounds: normal S1, normal S2, no S3 and no S4  no murmurs were heard  Carotid pulses: 2+ bilaterally  Abdominal aorta: Normal     Femoral pulses: 2+ bilaterally  Pedal pulses: 2+ bilaterally  Peripheral vascular exam: Normal     Examination of extremities for edema and/or varicosities: Normal     Chest   Palpation of breasts and axillae: Normal, no masses palpated  Abdomen   Abdomen: Non-tender, no masses  Bowel sounds were normal  The abdomen was soft and nontender  no masses palpated  Liver and spleen: No hepatomegaly or splenomegaly  Examination for hernias: No hernia appreciated  Anus, perineum, and rectum: Normal sphincter tone, no masses, no prolapse  Stool sample for occult blood: Negative  Lymphatic   Palpation of lymph nodes in neck: No lymphadenopathy  Palpation of lymph nodes in axillae: No lymphadenopathy  Skin   Skin and subcutaneous tissue: Normal without rashes or lesions  Neurologic   Cranial nerves: Cranial nerves II-XII intact  Cortical function: Normal mental status  Reflexes: 2+ and symmetric  Sensation: No sensory loss         Results/Data  Falls Risk Assessment (Dx Z13 89 Screen for Neurologic Disorder) 54Tht5295 01:36PM Terry Lockhart     Test Name Result Flag Reference   Falls Risk      No falls in the past year     PHQ-9 Adult Depression Screening 53Vmu8407 01:36PM Kalyani Berrios     Test Name Result Flag Reference   PHQ-9 Adult Depression Score 13     Over the last two weeks, how often have you been bothered by any of the following problems? Little interest or pleasure in doing things: More than half the days - 2  Feeling down, depressed, or hopeless: More than half the days - 2  Trouble falling or staying asleep, or sleeping too much: Nearly every day - 3  Feeling tired or having little energy: Nearly every day - 3  Poor appetite or over eating: Several days - 1  Feeling bad about yourself - or that you are a failure or have let yourself or your family down: Not at all - 0  Trouble concentrating on things, such as reading the newspaper or watching television: Several days - 1  Moving or speaking so slowly that other people could have noticed   Or the opposite -  being so fidgety or restless that you have been moving around a lot more than usual: Several days - 1  Thoughts that you would be better off dead, or of hurting yourself in some way: Not at all - 0   PHQ-9 Adult Depression Screening Positive     PHQ-9 Difficulty Level Very difficult     PHQ-9 Severity Moderate Depression       (1) CBC/PLT/DIFF [id number] 11:40AM Kalyani Berrios     Test Name Result Flag Reference   WHITE BLOOD CELL COUNT 5 2 Thousand/uL  3 8-10 8   RED BLOOD CELL COUNT 4 31 Million/uL  3 80-5 10   HEMOGLOBIN 13 1 g/dL  11 7-15 5   HEMATOCRIT 40 9 %  35 0-45 0   MCV 94 8 fL  80 0-100 0   MCH 30 3 pg  27 0-33 0   MCHC 31 9 g/dL L 32 0-36 0   RDW 13 7 %  11 0-15 0   PLATELET COUNT 796 Thousand/uL  140-400   MPV 8 3 fL  7 5-11 5   ABSOLUTE NEUTROPHILS 3661 cells/uL  9853-7218   ABSOLUTE LYMPHOCYTES 1040 cells/uL  850-3900   ABSOLUTE MONOCYTES 447 cells/uL  200-950   ABSOLUTE EOSINOPHILS 52 cells/uL     ABSOLUTE BASOPHILS 0 cells/uL  0-200   NEUTROPHILS 70 4 %     LYMPHOCYTES 20 0 %     MONOCYTES 8 6 %     EOSINOPHILS 1 0 % BASOPHILS 0 0 %       (1) COMPREHENSIVE METABOLIC PANEL 49NNM1877 87:35OT [facility] [employer]     Test Name Result Flag Reference   GLUCOSE 94 mg/dL  65-99   Fasting reference interval   UREA NITROGEN (BUN) 15 mg/dL  7-25   CREATININE 0 69 mg/dL  0 60-0 93   For patients >52years of age, the reference limit  for Creatinine is approximately 13% higher for people  identified as -American  eGFR NON-AFR  AMERICAN 84 mL/min/1 73m2  > OR = 60   eGFR AFRICAN AMERICAN 97 mL/min/1 73m2  > OR = 60   BUN/CREATININE RATIO   5-64   NOT APPLICABLE (calc)   SODIUM 132 mmol/L L 135-146   POTASSIUM 4 4 mmol/L  3 5-5 3   CHLORIDE 97 mmol/L L    CARBON DIOXIDE 26 mmol/L  20-31   CALCIUM 9 4 mg/dL  8 6-10 4   PROTEIN, TOTAL 7 5 g/dL  6 1-8 1   ALBUMIN 4 4 g/dL  3 6-5 1   GLOBULIN 3 1 g/dL (calc)  1 9-3 7   ALBUMIN/GLOBULIN RATIO 1 4 (calc)  1 0-2 5   BILIRUBIN, TOTAL 0 6 mg/dL  0 2-1 2   ALKALINE PHOSPHATASE 45 U/L     AST 20 U/L  10-35   ALT 7 U/L  6-29     (1) T4, FREE 37Bmd6139 11:40AM [facility] [employer]     Test Name Result Flag Reference   T4, FREE 1 4 ng/dL  0 8-1 8     (1) OP JOHN FERRITIN [id number] 11:40AM [facility] [employer]     Test Name Result Flag Reference   FERRITIN 106 ng/mL       (Q) TSH, 3RD GENERATION 19Fua7905 11:40AM [facility] [employer]     Test Name Result Flag Reference   TSH 1 23 mIU/L  0 40-4 50     (Q) HEMOGLOBIN A1c 73ALR1500 11:40AM [employer]   REPORT COMMENT:  FASTING:YES     Test Name Result Flag Reference   HEMOGLOBIN A1c 5 8 % of total Hgb H <5 7   According to ADA guidelines, hemoglobin A1c <7 0%  represents optimal control in non-pregnant diabetic  patients  Different metrics may apply to specific  patient populations  Standards of Medical Care in  [phone]  Diabetes Care   2013;36:s11-s66     For the purpose of screening for the presence of  diabetes  <5 7%       Consistent with the absence of diabetes  5 7-6 4%    Consistent with increased risk for diabetes              (prediabetes)  >or=6 5% Consistent with diabetes     This assay result is consistent with an increased risk  of diabetes  Currently, no consensus exists for use of hemoglobin  A1c for diagnosis of diabetes for children  Procedure    Procedure: Visual Acuity Test    Inforrmation supplied by a Snellen chart     Results: 20/40 in both eyes with corrective device      Future Appointments    Date/Time Provider Specialty Site   10/25/2016 02:45 PM Venecia Benavides DO [facility]     Signatures   Electronically signed by : Dennis Truong DO; Sep 13 2016  8:06PM EST                       (Author)

## 2018-01-11 NOTE — MISCELLANEOUS
Message  I spoke with the patient and reviewed the MRI of her lumbar spine on 03/28/2017 and reviewed the findings-it demonstrates an acute to subacute L1 compression fracture resulting in mild canal stenosis, confirming what we had found on x-ray  The patient is currently not having increased pain  She does not require surgery  We will monitor and follow up as scheduled        Signatures   Electronically signed by : Dennis Truong DO; May  8 2017 10:21AM EST                       (Author)

## 2018-01-12 NOTE — MISCELLANEOUS
Message   Recorded as Task   Date: 08/22/2016 05:12 PM, Created By: Brenna Haywood   Task Name: Care Coordination   Assigned To: Anna Morrison   Regarding Patient: Kathryn Parra, Status: Active   CommentBlanca Loose - 22 Aug 2016 5:12 PM     TASK CREATED  Caller: Amy Caraballo; Care Coordination  Amy Caraballo, PT from [place], called, she saw her today, BP elevated 150/90, having severe dizziness, any other reasons why she would be having this? Amy Caraballo will be sending you a letter  Priscilla Caldwell - 22 Aug 2016 6:47 PM     TASK EDITED   Katerine Mane - 23 Aug 2016 8:31 PM     TASK REASSIGNED: Previously Assigned To Katerine Mane      Please advise the patient that she should also schedule follow-up with her cardiologist in regards to her dizziness and fluctuations in her BP  Anna Morrison - 24 Aug 2016 12:53 PM     TASK EDITED  Patient advised to call Dr Beverly Ganser to advised them of BP and dizziness issues  PLM        Active Problems    1  Acid reflux (530 81) (K21 9)   2  Cerebrovascular disease (437 9) (I67 9)   3  Chronic fatigue (780 79) (R53 82)   4  Dizziness and giddiness (780 4) (R42)   5  Essential hypertension (401 9) (I10)   6  Gait instability (781 2) (R26 81)   7  GERD (gastroesophageal reflux disease) (530 81) (K21 9)   8  Lightheadedness (780 4) (R42)   9  Lipoma of scalp (214 1) (D17 0)   10  Lyme disease (088 81) (A69 20)   11  Memory loss (780 93) (R41 3)   12  Muscular deconditioning (781 99) (R29 898)   13  Osteoporosis (733 00) (M81 0)   14  Palpitations (785 1) (R00 2)   15  Parkinson's disease (332 0) (G20)    Current Meds   1  Atenolol 25 MG Oral Tablet; TAKE 1 TABLET 3 times daily; Therapy: (Recorded:85Tdn9597) to Recorded   2  Carbidopa-Levodopa ER  MG Oral Tablet Extended Release; TAKE 1 TABLET 4   TIMES DAILY; Therapy: (Recorded:08Mar2016) to Recorded   3  ClonazePAM 0 5 MG Oral Tablet; TAKE 1 TABLET Daily PRN anxiety; Therapy: (Recorded:49Rsc0524) to Recorded   4   ClonazePAM 1 MG Oral Tablet; Therapy: 61DTC9527 to (Evaluate:08Sep2016) Recorded   5  Memantine HCl - 5 MG Oral Tablet; 2 IN THE AM Recorded   6  Vitamin D CAPS; One daily; Therapy: (Recorded:10Jun2016) to Recorded   7  Zoledronic Acid 5 MG/100ML Intravenous Solution (Reclast); INFUSE 5MG   INTRAVENOUSLY OVER 15 MINUTES AS DIRECTED once yearly; Therapy: 45LKV8859 to (Evaluate:11Jun2016); Last Rx:10Jun2016 Ordered    Allergies    1   No Known Drug Allergies    Signatures   Electronically signed by : Raman Figueroa, ; Aug 24 2016 12:53PM EST                       (Author)

## 2018-01-13 VITALS
BODY MASS INDEX: 20.77 KG/M2 | HEIGHT: 61 IN | WEIGHT: 110 LBS | DIASTOLIC BLOOD PRESSURE: 80 MMHG | SYSTOLIC BLOOD PRESSURE: 124 MMHG | RESPIRATION RATE: 16 BRPM | TEMPERATURE: 97.2 F | HEART RATE: 66 BPM

## 2018-01-14 VITALS
WEIGHT: 111 LBS | BODY MASS INDEX: 20.96 KG/M2 | DIASTOLIC BLOOD PRESSURE: 80 MMHG | SYSTOLIC BLOOD PRESSURE: 130 MMHG | RESPIRATION RATE: 15 BRPM | HEART RATE: 64 BPM | TEMPERATURE: 96.7 F | HEIGHT: 61 IN

## 2018-01-15 NOTE — MISCELLANEOUS
Message  I spoke with the patient and reviewed the x-ray of her lumbar spine  It confirms that there is a compression fracture at L1 with some central protrusion  I advised her that we need to get some additional imaging and recommended an MRI of the lumbar spine without contrast  We will mail the patient paperwork since she wishes to get the testing performed at [facility]  She will continue with ice to the area periodically and I advised her to try extra strength Tylenol 500 mg one tablet twice a day to help with the discomfort  She denies any numbness or tingling in her legs or weakness  She's feeling well otherwise  We'll follow-up closely with the results the testing  Plan  Chronic midline low back pain without sciatica    · * MRI LUMBAR SPINE WO CONTRAST; Status:Active; Requested for:11Mar2017;     Results/Data     * XR SPINE LUMBAR MINIMUM 4 VIEWS NON INJURY   XR SPINE LUMBAR MINIMUM 4 VIEWS: LUMBAR SPINE     INDICATION:  M54 5: Low back pain   S32 010A: Wedge compression fracture of first lumbar vertebra, initial encounter for  closed fracture  History taken directly from the electronic ordering system  COMPARISON: None     VIEWS:  AP, lateral, bilateral oblique and coned down projections     IMAGES:  5     FINDINGS:     Slight reversal of the normal lumbar lordosis  There is near vertebral plana appearance to the L1 vertebral body  Signs of mild bony  retropulsion  Without prior images, difficult to ascertain whether this is progressed  Multilevel lower endplate degenerative changes noted  Visualized soft tissues appear unremarkable  IMPRESSION:     L1 compression fracture with near vertebral plana appearance centrally and mild bony  retropulsion         ##sigslh##sigslh             Workstation performed: [id number]     Signed by:   Destiney Spear MD   3/8/17     Signatures   Electronically signed by : Colt Mcgregor DO; Mar 11 2017  9:14AM EST (Author)

## 2018-01-15 NOTE — PROGRESS NOTES
Assessment    1  Lipoma of scalp (214 1) (D17 0)   · Right   2  Essential hypertension (401 9) (I10)   3  Parkinson's disease (332 0) (G20)   4  GERD (gastroesophageal reflux disease) (530 81) (K21 9)   5  Osteoporosis (733 00) (M81 0)    Plan  Essential hypertension, GERD (gastroesophageal reflux disease), Lipoma of scalp,  Osteoporosis    · Follow-up visit in 3 months Evaluation and Treatment  Follow-up  Status: Complete   Done: [id number]  Memory loss    · Namenda Titration Chriss 5 (28)-10 (21) MG Oral Tablet    Discussion/Summary    Dasia will monitor her BP at home once daily and call in 1-2 weeks with the readings  She will check it after resting for 5 minutes and will get 2 readings 1 minute apart and average them  We will see her back as scheduled  I advised her that the lesion on her scalp is consistent with a lipoma  It is benign in appearance  We can monitor it for the time being  She will call with any increasing pain or swelling  We'll follow-up with her as scheduled  Possible side effects of new medications were reviewed with the patient/guardian today  The treatment plan was reviewed with the patient/guardian  The patient/guardian understands and agrees with the treatment plan      Chief Complaint  States is here to review test results      History of Present Illness  Mary Mandy Luis is a 66 y/o female who presents for a follow up of her recent MRI of her brain  It demonstrated some mild extravascular ischemic changes which were unchanged from her previous MRI performed in September 2015  There is also evidence of a possible subcutaneous focal lesion in the right posterior scalp which was also evident on her prior MRI  We will need to do further evaluation of this in the office  There were no other findings on the MRI  She is scheduled to see GI for follow up with her GI, Dr France Patino for a colonoscopy and an EGD on 04/01/2016   This is to evaluate   She has felt a lump on the right side of her scalp for 2 years  There is no pain  There is no bleeding or drainage  There is no chest pain or shortness of breath  There are no headaches  There is constant lightheadedness  Review of Systems    Constitutional: as noted in HPI  Eyes: as noted in HPI    ENT: as noted in HPI  Cardiovascular: as noted in HPI  Respiratory: as noted in HPI  Gastrointestinal: as noted in HPI  Genitourinary: as noted in HPI  Musculoskeletal: as noted in HPI  Integumentary: as noted in HPI  Neurological: as noted in HPI  Psychiatric: as noted in HPI  Active Problems    1  Acid reflux (530 81) (K21 9)   2  Cerebrovascular disease (437 9) (I67 9)   3  Chronic fatigue (780 79) (R53 82)   4  Dizziness and giddiness (780 4) (R42)   5  Essential hypertension (401 9) (I10)   6  GERD (gastroesophageal reflux disease) (530 81) (K21 9)   7  Lightheadedness (780 4) (R42)   8  Lyme disease (088 81) (A69 20)   9  Memory loss (780 93) (R41 3)   10  Osteoporosis (733 00) (M81 0)   11  Palpitations (785 1) (R00 2)   12  Parkinson's disease (332 0) (G20)    Past Medical History    1  History of benign neoplasm of kidney, 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)   2  History of carcinoma in situ of cervix (V13 89) (Z86 008)   3  History of depression (V11 8) (Z86 59)   4  History of hypoglycemia (V12 29) (Z86 39)   5  History of irritable bowel syndrome (V12 79) (Z87 19)   6  History of Lung nodule (793 11) (R91 1)   7  History of Palpitations (785 1) (R00 2)   8  Screening for breast cancer (V76 10) (Z12 39)   9  Screening for colon cancer (V76 51) (Z12 11)   10  History of Vitamin D deficiency (268 9) (E55 9)    The active problems and past medical history were reviewed and updated today  Surgical History    1  History of Cholecystectomy   2  History of Hysterectomy    The surgical history was reviewed and updated today  Family History    1  No pertinent family history    2   Family history of myocardial infarction (V17 3) (Z82 49) 3  Family history of Adult hypothyroidism    4  Family history of Bilateral malignant neoplasm of breast in female, unspecified site of   breast    5  Family history of cerebral hemorrhage (V17 2) (Z82 0)    The family history was reviewed and updated today  Social History    ·    · Never a smoker   · No alcohol use   · No drug use   · Three children  The social history was reviewed and updated today  The social history was reviewed and is unchanged  Current Meds   1  Atenolol 25 MG Oral Tablet; TAKE 1 TABLET 3 times daily; Therapy: (Recorded:73Fdk5426) to Recorded   2  Carbidopa-Levodopa ER  MG Oral Tablet Extended Release; TAKE 1 TABLET 4   TIMES DAILY; Therapy: (Recorded:08Mar2016) to Recorded   3  ClonazePAM 0 5 MG Oral Tablet; TAKE 1 TABLET Daily PRN anxiety; Therapy: (Recorded:08Qit4127) to Recorded   4  Namenda Titration Chriss 5 (28)-10 (21) MG Oral Tablet; Take as directed; Therapy: 51OMY2709 to (Evaluate:05Apr2016); Last Rx:08Mar2016 Ordered   5  Pantoprazole Sodium TBEC; Take 1 tablet daily; Therapy: (Recorded:10Ufv3573) to Recorded    Allergies    1  No Known Drug Allergies    Vitals  Vital Signs [Data Includes: Current Encounter]    Recorded: 29WNH4899 12:24PM Recorded: 08ZBL6374 11:20AM   Heart Rate 64 66, L Radial   Pulse Quality  Regular   Systolic 938 255, LUE, Sitting   Diastolic 90 666, LUE, Sitting   Height  5 ft 1 in   Weight  115 lb    BMI Calculated  21 73   BSA Calculated  1 49     Physical Exam    Constitutional   General appearance: No acute distress, well appearing and well nourished  Eyes   Conjunctiva and lids: No swelling, erythema or discharge  Pupils and irises: Equal, round and reactive to light  Ears, Nose, Mouth, and Throat   External inspection of ears and nose: Normal     Otoscopic examination: Tympanic membranes translucent with normal light reflex  Canals patent without erythema      Nasal mucosa, septum, and turbinates: Normal without edema or erythema  Oropharynx: Normal with no erythema, edema, exudate or lesions  Pulmonary   Respiratory effort: No increased work of breathing or signs of respiratory distress  Auscultation of lungs: Clear to auscultation  Cardiovascular   Palpation of heart: Normal PMI, no thrills  Auscultation of heart: Normal rate and rhythm, normal S1 and S2, without murmurs  Examination of extremities for edema and/or varicosities: Normal     Carotid pulses: Normal     Abdomen   Abdomen: Non-tender, no masses  Liver and spleen: No hepatomegaly or splenomegaly  Lymphatic   Palpation of lymph nodes in neck: No lymphadenopathy  Musculoskeletal   Gait and station: Normal     Digits and nails: Normal without clubbing or cyanosis  Inspection/palpation of joints, bones, and muscles: Normal     Skin   Skin and subcutaneous tissue: Abnormal   There is a 1 5 cm subcutaneous lesion on the right posterior scalp  The lesion is soft and freely movable  It is nontender  Neurologic   Cranial nerves: Cranial nerves 2-12 intact  Reflexes: 2+ and symmetric  Sensation: No sensory loss  Psychiatric   Orientation to person, place, and time: Normal     Mood and affect: Normal          Health Management  Screening for colon cancer   COLONOSCOPY; every 5 years; Last 41BCK6570; Next Due: 84ANG7398;  Overdue    Future Appointments    Date/Time Provider Specialty Site   06/10/2016 02:45 PM Fior Maldonado DO Family Medicine [facility]     Signatures   Electronically signed by : Hugo Mejias DO; Mar 30 2016  6:06PM EST                       (Author)

## 2018-01-16 NOTE — RESULT NOTES
Message  Spoke with the patient and reviewed the results of her obstruction series  I advised her that it was negative and she to follow-up in the office with any further symptoms  Verified Results  * XR ABDOMEN OBSTRUCTION SERIES 22Nov2016 01:59PM Roxanna HANSON Order Number: [id number]     Test Name Result Flag Reference   XR ABDOMEN OBSTRUCTION SERIES (Report)     OBSTRUCTION SERIES     INDICATION: patient is having lower back pain and also constipation for about a week      COMPARISON: None     VIEWS: (Supine, erect abdomen and upright chest); 3 images     FINDINGS:     Mildly prominent stool throughout the colon without obstruction      No free air beneath the hemidiaphragms  No pathologic calcifications or soft tissue masses evident  Mild lumbar degenerative changes  Examination of the chest reveals a normal cardiomediastinal silhouette  Lungs are clear  IMPRESSION:     Mildly prominent colonic stool without obstruction         Workstation performed: [id number]     Signed by:   Krytsle Altman MD   11/23/16       Signatures   Electronically signed by : Colt Mcgregor DO; Nov 23 2016  3:51PM EST                       (Author)

## 2018-01-17 NOTE — MISCELLANEOUS
Message   Recorded as Task   Date: 04/05/2016 05:29 AM, Created By: Diamond Lambert   Task Name: Go to Note   Assigned To: Ronnymelani Donohue   Regarding Patient: Eneida Torres, Status: Active   Comment:    Priscilla Caldwell - 05 Apr 2016 5:29 AM     TASK CREATED  Please let the patient know that the carotid US was okay, we will follow up as scheduled  ShannonApril - 05 Apr 2016 9:49 AM     TASK EDITED  Patient was informed of results by home number  Active Problems    1  Acid reflux (530 81) (K21 9)   2  Cerebrovascular disease (437 9) (I67 9)   3  Chronic fatigue (780 79) (R53 82)   4  Dizziness and giddiness (780 4) (R42)   5  Essential hypertension (401 9) (I10)   6  GERD (gastroesophageal reflux disease) (530 81) (K21 9)   7  Lightheadedness (780 4) (R42)   8  Lipoma of scalp (214 1) (D17 0)   9  Lyme disease (088 81) (A69 20)   10  Memory loss (780 93) (R41 3)   11  Osteoporosis (733 00) (M81 0)   12  Palpitations (785 1) (R00 2)   13  Parkinson's disease (332 0) (G20)    Current Meds   1  Atenolol 25 MG Oral Tablet; TAKE 1 TABLET 3 times daily; Therapy: (Recorded:09Zyb9707) to Recorded   2  Carbidopa-Levodopa ER  MG Oral Tablet Extended Release; TAKE 1 TABLET 4   TIMES DAILY; Therapy: (Recorded:08Mar2016) to Recorded   3  ClonazePAM 0 5 MG Oral Tablet; TAKE 1 TABLET Daily PRN anxiety; Therapy: (Recorded:25Ukr2148) to Recorded   4  Pantoprazole Sodium TBEC; Take 1 tablet daily; Therapy: (Recorded:02Aqg9194) to Recorded    Allergies    1   No Known Drug Allergies    Signatures   Electronically signed by : Rosa Ortega, ; Apr 5 2016  9:50AM EST                       (Author)

## 2018-01-17 NOTE — MISCELLANEOUS
Message   Recorded as Task   Date: 03/15/2016 02:17 PM, Created By: Arash Bartholomew   Task Name: Call Back   Assigned To: Wayne Adams   Regarding Patient: Prasanna Sauceda, Status: Active   Comment:    FabyMarianne kruse - 15 Mar 2016 2:17 PM     TASK CREATED  Caller: Self; General Medical Question; [phone] (Home)  PT SAW DR BALLARD LAST WEEK, AND WAS TOLD TO GET BACK TO YOU ABOUT CHANGING HER ULCER MEDICATION  SHE CAN BE REACHED [phone]  Priscilla Caldwell - 15 Mar 2016 9:01 PM     TASK EDITED  I spoke with the patient  She states the infectious disease doctor felt her generic Protonix was causing her nausea  The patient has been on this for history of gastric ulcer and GERD  She states this has been working well for her  She feels it actually helps her nausea  I advised her we will keep her medication regimen the same for now    I encouraged her to schedule a follow-up with her GI specialist for further discussion of her medication        Signatures   Electronically signed by : Salú Bobo DO; Mar 15 2016  9:02PM EST                       (Author)

## 2018-01-18 NOTE — PROGRESS NOTES
Assessment    1  Chronic fatigue (780 79) (R53 82)   2  Lightheadedness (780 4) (R42)   3  Memory loss (780 93) (R41 3)   4  Dizziness and giddiness (780 4) (R42)   5  Parkinson's disease (332 0) (G20)   6  Lyme disease (088 81) (A69 20)   7  Essential hypertension (401 9) (I10)   8  Acid reflux (530 81) (K21 9)    Plan  Chronic fatigue, Dizziness and giddiness, Essential hypertension, Lightheadedness,  Lyme disease, Memory loss, Parkinson's disease    · (1) CBC/PLT/DIFF; Status:Active; Requested for:56Glc4619;    · (1) COMPREHENSIVE METABOLIC PANEL; Status:Active; Requested for:32Uws8659;    · (1) SARAH BARR VIRUS; Status:Active; Requested for:29Cwc2380;    · (1) FERRITIN; Status:Active; Requested for:81Vrw2020;    · (1) FOLATE; Status:Active; Requested for:47Ura5603;    · (1) HEMOGLOBIN A1C; Status:Active; Requested for:79Faq4965;    · (1) LDL,DIRECT; Status:Active; Requested for:75Cow2284;    · (1) LIPID PANEL, FASTING; Status:Active; Requested for:33Vam5761;    · (1) LYME ANTIBODY, WESTERN BLOT; Status:Active; Requested for:23Tga7289;    · (1) MONO TEST; Status:Active; Requested for:39Mxs7621;    · (1) T4, FREE; Status:Active; Requested for:72Xma2456;    · (1) TSH; Status:Active; Requested for:57Ssr7758;    · (1) URINALYSIS w URINE C/S REFLEX (will reflex a microscopy if leukocytes, occult  blood, or nitrites are not within normal limits); Status:Active; Requested for:22Rnr5614;    · (1) VITAMIN B12; Status:Active; Requested for:11Upr8697;    · (1) VITAMIN D 25-HYDROXY; Status:Active; Requested for:99Ioy4865;    · Follow-up visit in 2 weeks Evaluation and Treatment  Follow-up  Status: Complete  Done:  [id number]    Discussion/Summary  Discussion Summary:   The patient has multiple vague complaints  We will try to get records from her specialists for further clarification of her condition  I am not certain that all her symptoms can be contributed to chronic Lyme disease   I'm going to order the lab work on her to further evaluate her and rule out other possible conditions  She'll continue with her healthy diet and try to stay active  We will recheck her in the office again in 2 weeks to discuss her lab work at that time  Counseling Documentation With Imm: The patient was counseled regarding diagnostic results, instructions for management, risk factor reductions, prognosis, patient and family education, impressions, risks and benefits of treatment options, importance of compliance with treatment  Medication SE Review and Pt Understands Tx: Possible side effects of new medications were reviewed with the patient/guardian today  The treatment plan was reviewed with the patient/guardian  The patient/guardian understands and agrees with the treatment plan      Chief Complaint  Chief Complaint Free Text Note Form: Patient initial visit, patient would like to discuss Lyme disease and other topics of her health  History of Present Illness  HPI: Klarissa Betts is a 68 y/o female who presents as a new patient today  She has a history of Lyme disease since 2005  She had not been feeling well for 2 years up to that point and her doctor had sent a blood test to [place] confirming Lyme disease  She states she has been getting IV antibiotics on and off since then  She had been seeing an infectious disease physician, Dr Matty Coffman in [facility]  She states she has been feeling "lousy"  She has not seen the specialist since July 2015  She states she has not been feeling well for a long time since diagnosed with Lyme  She feels dizzy  She has been having stomach pain in the mid abdomen for 2 weeks  She states she has a discomfort in that area when she takes a deep breath and then exhales  There is no chest pain or shortness of breath  There is no heartburn symptoms  There is no vomiting  She feels nauseated  There is no diarrhea  She will get frequent constipation    She had an upper endoscopy in the past year and she was told she had a stomach ulcer  There is relief of the GERD with the Pantoprazole  She sees Dr Gloria Winslow in [place]  There is no blood in her stools or black tarry stools  She sees a Cardiologist, Dr Trevin Terry for palpitations and is on Atenolol for this  She sees the heart doctor every 3-5 months  There are no fainting spells  She is also seeing a neurologist for Parkinson's Disease and she just saw her recently  She is on the carbidopa-levodopa QID  She is due to see neurology again in 6 months  She takes a lot of supplements including Vitamin C, Vitamin, D  She has been very fatigued and tired  She had a colonoscopy 3 years ago  She sees a GYN and she has an order for a mammogram from her gynecologist    Henry Steele Feeling: The patient is being seen for an initial evaluation of a general ill feeling  Symptoms:  general ill feeling, general discomfort, malaise, fatigue, myalgias and muscle weakness, but no fever, no chills, no night sweats, no arthralgias, no joint swelling, no joint stiffness, no weight loss, no weight gain, no difficulty sleeping, no anxiety, no depression and no suicidal thoughts  Parkinsons Disease (Brief): The patient is being seen for an initial evaluation of an existing diagnosis of Parkinson's disease  Symptoms:  tremor at rest, pill-rolling hand tremor, rigidity of movement and bradykinesia, but no frequent falls, no gait disturbance, no postural instability, no decreased blinking, no difficulty initiating movement, no paucity of movement, no akathisia, no limb clumsiness, no decreased facial expression, no altered speech and no worsening handwriting  No associated symptoms are reported  Lyme Disease (Follow-Up): The patient is being seen for follow-up of Lyme disease  She has had no significant interval events  The patient is currently asymptomatic  Memory Loss: The patient is being seen for an initial evaluation of memory loss   Symptoms:  memory loss, transient memory loss, impaired short-term memory and impaired learning ability, but no sustained memory loss, no impaired long-term memory, no impaired memory of past events, no inability to form new memories, no reduced general fund of knowledge, no difficulty performing familiar tasks and no awareness of the memory impairment  No associated symptoms are reported  Hypertension (Follow-Up): The patient presents for follow-up of essential hypertension  The patient states she has been doing well with her blood pressure control since the last visit  She has no comorbid illnesses  She has no significant interval events  Symptoms: The patient is currently asymptomatic  Review of Systems  Complete-Female:   Constitutional: as noted in HPI  Eyes: as noted in HPI    ENT: as noted in HPI  Cardiovascular: as noted in HPI  Respiratory: as noted in HPI  Gastrointestinal: as noted in HPI  Genitourinary: as noted in HPI  Musculoskeletal: as noted in HPI  Integumentary: as noted in HPI  Neurological: as noted in HPI  Psychiatric: as noted in HPI  Active Problems    1  Cerebrovascular disease (437 9) (I67 9)   2  Dizziness and giddiness (780 4) (R42)   3  Essential hypertension (401 9) (I10)   4  Lyme disease (088 81) (A69 20)   5  Parkinson's disease (332 0) (G20)    Past Medical History    1  History of benign neoplasm of kidney, 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)   2  History of carcinoma in situ of cervix (V13 89) (Z86 008)   3  History of depression (V11 8) (Z86 59)   4  History of hypoglycemia (V12 29) (Z86 39)   5  History of irritable bowel syndrome (V12 79) (Z87 19)   6  History of osteoporosis (V13 59) (Z87 39)   7  History of Lung nodule (793 11) (R91 1)   8  History of Palpitations (785 1) (R00 2)   9  History of Vitamin D deficiency (268 9) (E55 9)  Active Problems And Past Medical History Reviewed: The active problems and past medical history were reviewed and updated today  Surgical History    1   History of Hysterectomy  Surgical History Reviewed: The surgical history was reviewed and updated today  Family History    1  No pertinent family history    2  Family history of myocardial infarction (V17 3) (Z82 49)    3  Family history of Adult hypothyroidism    4  Family history of Bilateral malignant neoplasm of breast in female, unspecified site of   breast  Family History Reviewed: The family history was reviewed and updated today  Social History    ·    · Never a smoker   · No alcohol use   · No drug use   · Three children  Social History Reviewed: The social history was reviewed and updated today  The social history was reviewed and is unchanged  Current Meds   1  Atenolol 25 MG Oral Tablet; TAKE 1 TABLET 3 times daily; Therapy: (Recorded:75Mxu2243) to Recorded   2  Carbidopa-Levodopa ER  MG Oral Tablet Extended Release; Therapy: (Recorded:25Feb2016) to Recorded   3  ClonazePAM 0 5 MG Oral Tablet; TAKE 1 TABLET Daily PRN anxiety; Therapy: (Recorded:25Feb2016) to Recorded   4  Pantoprazole Sodium TBEC; Take 1 tablet daily; Therapy: (Recorded:25Feb2016) to Recorded    Allergies    1  No Known Drug Allergies    Vitals  Vital Signs [Data Includes: Current Encounter]    Recorded: 25Feb2016 02:24PM Recorded: 25Feb2016 01:11PM   Temperature  97 F, Tympanic   Heart Rate  60   Respiration  10   Systolic 457    Diastolic 80    Height  5 ft 1 in   Weight  116 lb    BMI Calculated  21 92   BSA Calculated  1 5     Physical Exam    Constitutional   General appearance: No acute distress, well appearing and well nourished  Eyes   Conjunctiva and lids: No swelling, erythema or discharge  Pupils and irises: Equal, round and reactive to light  Ears, Nose, Mouth, and Throat   External inspection of ears and nose: Normal     Otoscopic examination: Tympanic membranes translucent with normal light reflex  Canals patent without erythema      Nasal mucosa, septum, and turbinates: Normal without edema or erythema  Oropharynx: Normal with no erythema, edema, exudate or lesions  Pulmonary   Respiratory effort: No increased work of breathing or signs of respiratory distress  Auscultation of lungs: Clear to auscultation  Cardiovascular   Palpation of heart: Normal PMI, no thrills  Auscultation of heart: Normal rate and rhythm, normal S1 and S2, without murmurs  Examination of extremities for edema and/or varicosities: Normal     Carotid pulses: Normal     Abdomen   Abdomen: Non-tender, no masses  Liver and spleen: No hepatomegaly or splenomegaly  Lymphatic   Palpation of lymph nodes in neck: No lymphadenopathy  Musculoskeletal   Gait and station: Normal     Digits and nails: Normal without clubbing or cyanosis  Inspection/palpation of joints, bones, and muscles: Normal     Skin   Skin and subcutaneous tissue: Normal without rashes or lesions  Neurologic   Cranial nerves: Cranial nerves 2-12 intact  Reflexes: 2+ and symmetric  Sensation: No sensory loss      Psychiatric   Orientation to person, place, and time: Normal     Mood and affect: Normal          Future Appointments    Date/Time Provider Specialty Site   03/09/2016 02:15 PM Kemar Tejada DO Family Medicine [facility]     Signatures   Electronically signed by : Jess Voss DO; Feb 26 2016  7:35PM EST                       (Author)

## 2018-01-18 NOTE — MISCELLANEOUS
Message   Recorded as Task   Date: 10/31/2016 02:58 PM, Created By: Mariano Post   Task Name: Follow Up   Assigned To: Mik Diaz   Regarding Patient: Svetlana Martell, Status: Active   CommentRicherd Ebbing - 31 Oct 2016 2:58 PM     TASK CREATED  Caller: Self; General Medical Question; [phone] (Home)  PT HAS QUESTIONS REGARDING HER ANXIETY AND DEPRESSION MEDICATIONS AND THE DOSE OF THE CLONAZEPAM  CAN CALL PT  E Antonella - 01 Nov 2016 3:08 PM     TASK EDITED                 I spoke with the patient and reviewed her medication list and clarified the medications  We clarified that she should be taking the clonazepam 0 5 mg daily for her anxiety as needed  She discontinued the Bupropion because it was not helping her and she feels fine off of it  We will see her back as scheduled          Signatures   Electronically signed by : June Dance, DO; Nov 1 2016  3:10PM EST                       (Author)

## 2018-01-22 VITALS
TEMPERATURE: 97 F | WEIGHT: 110 LBS | RESPIRATION RATE: 17 BRPM | HEART RATE: 70 BPM | BODY MASS INDEX: 20.77 KG/M2 | SYSTOLIC BLOOD PRESSURE: 120 MMHG | DIASTOLIC BLOOD PRESSURE: 80 MMHG | HEIGHT: 61 IN

## 2018-04-13 ENCOUNTER — OFFICE VISIT (OUTPATIENT)
Dept: FAMILY MEDICINE CLINIC | Facility: CLINIC | Age: 78
End: 2018-04-13
Payer: MEDICARE

## 2018-04-13 VITALS
WEIGHT: 109 LBS | OXYGEN SATURATION: 98 % | BODY MASS INDEX: 20.58 KG/M2 | HEART RATE: 66 BPM | SYSTOLIC BLOOD PRESSURE: 138 MMHG | DIASTOLIC BLOOD PRESSURE: 82 MMHG | HEIGHT: 61 IN

## 2018-04-13 DIAGNOSIS — R20.0 NUMBNESS AND TINGLING OF FOOT: ICD-10-CM

## 2018-04-13 DIAGNOSIS — R20.2 NUMBNESS AND TINGLING OF FOOT: ICD-10-CM

## 2018-04-13 DIAGNOSIS — G62.9 PERIPHERAL POLYNEUROPATHY: Primary | ICD-10-CM

## 2018-04-13 PROBLEM — G89.29 CHRONIC MIDLINE LOW BACK PAIN WITHOUT SCIATICA: Status: ACTIVE | Noted: 2017-03-07

## 2018-04-13 PROBLEM — S32.010A COMPRESSION FRACTURE OF FIRST LUMBAR VERTEBRA (HCC): Status: ACTIVE | Noted: 2017-03-07

## 2018-04-13 PROBLEM — M54.50 CHRONIC MIDLINE LOW BACK PAIN WITHOUT SCIATICA: Status: ACTIVE | Noted: 2017-03-07

## 2018-04-13 PROBLEM — I73.9 PERIPHERAL VASCULAR DISEASE (HCC): Status: ACTIVE | Noted: 2017-10-04

## 2018-04-13 PROCEDURE — 99213 OFFICE O/P EST LOW 20 MIN: CPT | Performed by: FAMILY MEDICINE

## 2018-04-13 RX ORDER — CARBIDOPA AND LEVODOPA 25; 100 MG/1; MG/1
1 TABLET, EXTENDED RELEASE ORAL 4 TIMES DAILY
COMMUNITY

## 2018-04-13 RX ORDER — ZOLEDRONIC ACID 5 MG/100ML
INJECTION, SOLUTION INTRAVENOUS
COMMUNITY
Start: 2016-06-10 | End: 2018-04-13 | Stop reason: ALTCHOICE

## 2018-04-13 RX ORDER — OMEPRAZOLE 20 MG/1
1 CAPSULE, DELAYED RELEASE ORAL DAILY
COMMUNITY
Start: 2017-01-31 | End: 2018-04-13 | Stop reason: ALTCHOICE

## 2018-04-13 RX ORDER — MULTIVIT-MIN/IRON/FOLIC ACID/K 18-600-40
CAPSULE ORAL DAILY
COMMUNITY

## 2018-04-13 RX ORDER — ESCITALOPRAM OXALATE 10 MG/1
20 TABLET ORAL DAILY
COMMUNITY
Start: 2017-01-31

## 2018-04-13 RX ORDER — CLONAZEPAM 0.5 MG/1
1 TABLET ORAL DAILY PRN
COMMUNITY
End: 2018-04-13 | Stop reason: ALTCHOICE

## 2018-04-13 RX ORDER — BUPROPION HYDROCHLORIDE 150 MG/1
150 TABLET, EXTENDED RELEASE ORAL DAILY
COMMUNITY

## 2018-04-13 RX ORDER — MEMANTINE HYDROCHLORIDE 10 MG/1
1 TABLET ORAL 2 TIMES DAILY
COMMUNITY
Start: 2016-08-29

## 2018-04-13 RX ORDER — ATENOLOL 25 MG/1
1 TABLET ORAL DAILY
COMMUNITY

## 2018-04-13 NOTE — PROGRESS NOTES
Assessment/Plan:  Peripheral neuropathy with numbness and tingling in both feet-we will send the patient for the lab work as ordered to evaluate for underlying causes her symptoms  We will follow closely with the results  I encouraged her to contact her neurologist to arrange a follow-up for further evaluation of this  She will continue with her current medications and we will follow up with her after the testing  Diagnoses and all orders for this visit:    Peripheral polyneuropathy  -     HEMOGLOBIN A1C W/ EAG ESTIMATION; Future  -     Comprehensive metabolic panel; Future  -     CBC and differential; Future  -     TSH, 3rd generation; Future  -     T4, free; Future  -     Vitamin B12; Future  -     Folate; Future  -     Lyme disease, western blot; Future  -     Sedimentation rate, automated; Future    Numbness and tingling of bilateral feet  Comments:  B  Orders:  -     HEMOGLOBIN A1C W/ EAG ESTIMATION; Future  -     Comprehensive metabolic panel; Future  -     CBC and differential; Future  -     TSH, 3rd generation; Future  -     T4, free; Future  -     Vitamin B12; Future  -     Folate; Future  -     Lyme disease, western blot; Future  -     Sedimentation rate, automated; Future    Other orders  -     atenolol (TENORMIN) 25 mg tablet; Take 1 tablet by mouth daily  -     carbidopa-levodopa (SINEMET CR)  mg TBCR per ER tablet; Take 1 tablet by mouth 4 (four) times a day  -     Discontinue: clonazePAM (KlonoPIN) 0 5 mg tablet; Take 1 tablet by mouth daily as needed  -     escitalopram (LEXAPRO) 10 mg tablet; Take 20 mg by mouth daily    -     memantine (NAMENDA) 10 mg tablet; Take 1 tablet by mouth 2 (two) times a day  -     Discontinue: omeprazole (PriLOSEC) 20 mg delayed release capsule; Take 1 capsule by mouth daily  -     Cholecalciferol (VITAMIN D) 2000 units CAPS; Take by mouth daily  -     Discontinue: zoledronic acid (RECLAST) 5 mg/100 mL IV infusion (premix);  Infuse into a venous catheter  - buPROPion (WELLBUTRIN SR) 150 mg 12 hr tablet; Take 150 mg by mouth daily       No Follow-up on file  Subjective:   Chief Complaint   Patient presents with    Foot Tingling     patient states she has tingling on the bottom of both feet that feels like she is walking on marbles for a couple months        Patient ID: Chelly Alejo is a 65 y o  female presents today for tingling in both feet  Chelly Alejo is a 65 y o  female presents today for evaluation of tingling in both feet for several months  It moves around and is worse when she is at rest   She states this moves around her feet and is constant  There is no radiation  There is no difficulty walking  She has not had this before  The dorsum of the feet is red  There is no pain in her legs with walking  She feels better with exercise  There is no increased pain in her back  There is no loss of bowel or bladder function  She recently saw her neurologist 6 months ago and was not having the problems then  The patient denies any chest pain, shortness of breath, or palpitations  There is no edema  There are no headaches or visual changes  There is no lightheadedness, dizziness, or fainting spells  There are no fevers or chills  There is no difficulty walking  There are no lesions on her feet  She did have a arterial scan duplex of both lower extremities performed in October 2017 which did not demonstrate any evidence of peripheral vascular disease          The following portions of the patient's history were reviewed and updated as appropriate: allergies, current medications, past family history, past medical history, past social history, past surgical history and problem list   Patient Active Problem List   Diagnosis    Acid reflux    Cerebrovascular disease    Chronic constipation    Chronic fatigue    Chronic midline low back pain without sciatica    Compression fracture of first lumbar vertebra ([address] )    Depression    Essential hypertension    Gait instability    Gastroesophageal reflux disease    Lipoma of scalp    Lyme disease    Memory loss    Osteoporosis    Paresthesia of both feet    Parkinson's disease ([address] )    Peripheral neuropathy    Peripheral vascular disease (HCC)     Past Medical History:   Diagnosis Date    Benign neoplasm of kidney, except pelvis     Carcinoma in situ of cervix     Hypoglycemia     IBS (irritable bowel syndrome)     Lung nodule     Palpitations     Vitamin D deficiency      Past Surgical History:   Procedure Laterality Date    CHOLECYSTECTOMY      HYSTERECTOMY       No Known Allergies  Family History   Problem Relation Age of Onset    Heart attack Father      MI    Breast cancer Sister 79     unspecified site of breast    Other Brother      cerebral hemorrhage    Hypothyroidism Daughter      adult    No Known Problems Mother      Social History     Social History    Marital status: /Civil Union     Spouse name: N/A    Number of children: 3    Years of education: N/A     Occupational History    Not on file  Social History Main Topics    Smoking status: Never Smoker    Smokeless tobacco: Never Used    Alcohol use No    Drug use: No    Sexual activity: Not on file     Other Topics Concern    Not on file     Social History Narrative    No narrative on file     No current outpatient prescriptions on file prior to visit  No current facility-administered medications on file prior to visit  Review of Systems   Constitutional: Negative  HENT: Negative  Eyes: Negative  Respiratory: Negative for apnea, cough, choking, chest tightness, shortness of breath, wheezing and stridor  Cardiovascular: Negative for chest pain, palpitations and leg swelling  Gastrointestinal: Negative for abdominal distention, abdominal pain, anal bleeding, blood in stool, constipation, diarrhea, nausea, rectal pain and vomiting     Endocrine: Negative for cold intolerance, heat intolerance, polydipsia, polyphagia and polyuria  Genitourinary: Negative  Musculoskeletal: Negative  Allergic/Immunologic: Negative  Neurological: Positive for numbness  Negative for dizziness, tremors, seizures, syncope, facial asymmetry, speech difficulty, weakness, light-headedness and headaches  Hematological: Negative  Psychiatric/Behavioral: Negative  Objective:  Vitals:    04/13/18 1049   BP: 138/82   BP Location: Left arm   Patient Position: Sitting   Cuff Size: Standard   Pulse: 66   SpO2: 98%   Weight: 49 4 kg (109 lb)   Height: 5' 1" (1 549 m)     Body mass index is 20 6 kg/m²  Physical Exam   Constitutional: She is oriented to person, place, and time  She appears well-developed and well-nourished  HENT:   Head: Normocephalic and atraumatic  Mouth/Throat: No oropharyngeal exudate  Eyes: Conjunctivae and EOM are normal  Pupils are equal, round, and reactive to light  Neck: Normal range of motion  No JVD present  No tracheal deviation present  No thyromegaly present  Cardiovascular: Normal rate, regular rhythm, normal heart sounds and intact distal pulses  Exam reveals no gallop and no friction rub  No murmur heard  Pulmonary/Chest: Effort normal and breath sounds normal  No stridor  No respiratory distress  She has no wheezes  She has no rales  She exhibits no tenderness  Abdominal: Soft  Bowel sounds are normal  She exhibits no distension and no mass  There is no tenderness  There is no rebound and no guarding  Musculoskeletal: Normal range of motion  She exhibits no edema, tenderness or deformity  Lymphadenopathy:     She has no cervical adenopathy  Neurological: She is alert and oriented to person, place, and time  She has normal reflexes  She displays normal reflexes  No cranial nerve deficit  She exhibits normal muscle tone  Coordination normal    There is normal sensation in her feet bilaterally  Skin: Skin is warm and dry

## 2018-04-17 LAB
ALBUMIN SERPL-MCNC: 4.2 G/DL (ref 3.6–5.1)
ALBUMIN/GLOB SERPL: 1.4 (CALC) (ref 1–2.5)
ALP SERPL-CCNC: 68 U/L (ref 33–130)
ALT SERPL-CCNC: 6 U/L (ref 6–29)
AST SERPL-CCNC: 20 U/L (ref 10–35)
B BURGDOR IGG SER QL IB: NEGATIVE
B BURGDOR IGM SER QL IB: NEGATIVE
B BURGDOR18KD IGG SER QL IB: ABNORMAL
B BURGDOR23KD IGG SER QL IB: ABNORMAL
B BURGDOR23KD IGM SER QL IB: ABNORMAL
B BURGDOR28KD IGG SER QL IB: ABNORMAL
B BURGDOR30KD IGG SER QL IB: ABNORMAL
B BURGDOR39KD IGG SER QL IB: REACTIVE
B BURGDOR39KD IGM SER QL IB: ABNORMAL
B BURGDOR41KD IGG SER QL IB: REACTIVE
B BURGDOR41KD IGM SER QL IB: REACTIVE
B BURGDOR45KD IGG SER QL IB: REACTIVE
B BURGDOR58KD IGG SER QL IB: REACTIVE
B BURGDOR66KD IGG SER QL IB: ABNORMAL
B BURGDOR93KD IGG SER QL IB: ABNORMAL
BASOPHILS # BLD AUTO: 30 CELLS/UL (ref 0–200)
BASOPHILS NFR BLD AUTO: 0.5 %
BILIRUB SERPL-MCNC: 0.3 MG/DL (ref 0.2–1.2)
BUN SERPL-MCNC: 24 MG/DL (ref 7–25)
BUN/CREAT SERPL: ABNORMAL (CALC) (ref 6–22)
CALCIUM SERPL-MCNC: 9.6 MG/DL (ref 8.6–10.4)
CHLORIDE SERPL-SCNC: 102 MMOL/L (ref 98–110)
CO2 SERPL-SCNC: 32 MMOL/L (ref 20–31)
CREAT SERPL-MCNC: 0.9 MG/DL (ref 0.6–0.93)
EOSINOPHIL # BLD AUTO: 130 CELLS/UL (ref 15–500)
EOSINOPHIL NFR BLD AUTO: 2.2 %
ERYTHROCYTE [DISTWIDTH] IN BLOOD BY AUTOMATED COUNT: 12.2 % (ref 11–15)
ERYTHROCYTE [SEDIMENTATION RATE] IN BLOOD BY WESTERGREN METHOD: 25 MM/H
FOLATE SERPL-MCNC: >24 NG/ML
GLOBULIN SER CALC-MCNC: 3 G/DL (CALC) (ref 1.9–3.7)
GLUCOSE SERPL-MCNC: 89 MG/DL (ref 65–139)
HBA1C MFR BLD: 5.4 % OF TOTAL HGB
HCT VFR BLD AUTO: 35.9 % (ref 35–45)
HGB BLD-MCNC: 12.5 G/DL (ref 11.7–15.5)
LYMPHOCYTES # BLD AUTO: 885 CELLS/UL (ref 850–3900)
LYMPHOCYTES NFR BLD AUTO: 15 %
MCH RBC QN AUTO: 32.4 PG (ref 27–33)
MCHC RBC AUTO-ENTMCNC: 34.8 G/DL (ref 32–36)
MCV RBC AUTO: 93 FL (ref 80–100)
MONOCYTES # BLD AUTO: 584 CELLS/UL (ref 200–950)
MONOCYTES NFR BLD AUTO: 9.9 %
NEUTROPHILS # BLD AUTO: 4272 CELLS/UL (ref 1500–7800)
NEUTROPHILS NFR BLD AUTO: 72.4 %
PLATELET # BLD AUTO: 242 THOUSAND/UL (ref 140–400)
PMV BLD REES-ECKER: 10.1 FL (ref 7.5–12.5)
POTASSIUM SERPL-SCNC: 4.5 MMOL/L (ref 3.5–5.3)
PROT SERPL-MCNC: 7.2 G/DL (ref 6.1–8.1)
RBC # BLD AUTO: 3.86 MILLION/UL (ref 3.8–5.1)
SL AMB EGFR AFRICAN AMERICAN: 71 ML/MIN/1.73M2
SL AMB EGFR NON AFRICAN AMERICAN: 61 ML/MIN/1.73M2
SODIUM SERPL-SCNC: 139 MMOL/L (ref 135–146)
T4 FREE SERPL-MCNC: 1.3 NG/DL (ref 0.8–1.8)
TSH SERPL-ACNC: 0.68 MIU/L (ref 0.4–4.5)
VIT B12 SERPL-MCNC: 850 PG/ML (ref 200–1100)
WBC # BLD AUTO: 5.9 THOUSAND/UL (ref 3.8–10.8)

## 2018-04-19 ENCOUNTER — TELEPHONE (OUTPATIENT)
Dept: FAMILY MEDICINE CLINIC | Facility: CLINIC | Age: 78
End: 2018-04-19

## 2018-04-19 NOTE — TELEPHONE ENCOUNTER
Please let the patient know that her labs looked okay  There is no evidence of diabetes  She should follow up with her neurologist as advised

## 2018-09-27 PROBLEM — F32.A DEPRESSION: Status: ACTIVE | Noted: 2018-01-01

## 2018-09-27 PROBLEM — I10 ESSENTIAL HYPERTENSION: Status: ACTIVE | Noted: 2018-01-01

## 2018-09-27 NOTE — PROGRESS NOTES
"Subjective      Patient ID: Kassie Huerta is a 78 y.o. female.  1940      HPI  Parkinsons  Followed by Dr Kimble  Depression on Wellbutrin and Lexapro   Memory  Followed by Neurology  HTN  Followed by Cardiology Dr Franco  Had labs done in April   The following have been reviewed and updated as appropriate in this visit:  Tobacco  Problems  Med Hx  Surg Hx  Fam Hx  Soc Hx      Review of Systems   Constitutional: Positive for fatigue. Negative for activity change and appetite change.   Respiratory: Negative for cough, shortness of breath and wheezing.    Cardiovascular: Negative for chest pain, palpitations and leg swelling.   Gastrointestinal: Negative for abdominal distention, abdominal pain, constipation, diarrhea, nausea and vomiting.   Endocrine: Negative for cold intolerance and heat intolerance.   Genitourinary: Negative for difficulty urinating and frequency.   Skin: Negative for rash.   Neurological: Negative for dizziness, weakness, light-headedness and numbness.   Psychiatric/Behavioral: Positive for dysphoric mood. Negative for sleep disturbance. The patient is not nervous/anxious.        Objective     Vitals:    09/27/18 1336   BP: (!) 142/70   BP Location: Left upper arm   Patient Position: Sitting   Pulse: 79   Temp: 36.7 °C (98.1 °F)   TempSrc: Oral   SpO2: 98%   Weight: 47.6 kg (105 lb)   Height: 1.499 m (4' 11\")     Body mass index is 21.21 kg/m².    Physical Exam   Constitutional: She is oriented to person, place, and time. She appears well-developed and well-nourished.   Cardiovascular: Normal rate and regular rhythm.    No murmur heard.  Pulmonary/Chest: Effort normal. She has no wheezes.   Abdominal: Soft. There is no tenderness.   Neurological: She is alert and oriented to person, place, and time.   Psychiatric: She has a normal mood and affect.   Nursing note and vitals reviewed.      Assessment/Plan   Problem List Items Addressed This Visit     Memory loss     Per Dr Kimble         " Parkinson's disease (CMS/HCC) ([facility])     Dr Kimble         Relevant Medications    carbidopa-levodopa (SINEMET CR)  mg per CR tablet    Essential hypertension     Hypertension well controlled  Recommend regular exercise and low salt diet  Risk and potential complications of hypertension discussed  Importance of medication compliance and regular follow up emphasized  Role of medication/diet/exercise in treating hypertension discussed  Treatment plan and follow up reviewed and understood by patient  Will get records from Dr Franco         Relevant Medications    atenolol (TENORMIN) 25 mg tablet    Depression - Primary     Continue Lexapro and Wellbutrin as prescribed by Neurology         Relevant Medications    escitalopram (LEXAPRO) 10 mg tablet    buPROPion SR (WELLBUTRIN SR) 150 mg 12 hr tablet        Current Outpatient Prescriptions:   •  atenolol (TENORMIN) 25 mg tablet, Take 1 tablet by mouth., Disp: , Rfl:   •  buPROPion SR (WELLBUTRIN SR) 150 mg 12 hr tablet, Take 150 mg by mouth., Disp: , Rfl:   •  calcium citrate-vitamin D3 (CITRACAL) 200 mg calcium -250 unit tablet, Take by mouth daily., Disp: , Rfl:   •  carbidopa-levodopa (SINEMET CR)  mg per CR tablet, Take 1 tablet by mouth., Disp: , Rfl:   •  cholecalciferol, vitamin D3, 1,000 unit tablet, Take 1,000 Units by mouth daily., Disp: , Rfl:   •  escitalopram (LEXAPRO) 10 mg tablet, Take 20 mg by mouth., Disp: , Rfl:   •  memantine (NAMENDA) 10 mg tablet, Take 1 tablet by mouth., Disp: , Rfl:   Return in about 6 months (around 3/27/2019) for MAW.  Declines flu shot  PVU

## 2018-09-27 NOTE — ASSESSMENT & PLAN NOTE
Hypertension well controlled  Recommend regular exercise and low salt diet  Risk and potential complications of hypertension discussed  Importance of medication compliance and regular follow up emphasized  Role of medication/diet/exercise in treating hypertension discussed  Treatment plan and follow up reviewed and understood by patient  Will get records from Dr Franco

## 2018-12-04 NOTE — PROGRESS NOTES
Subjective      Patient ID: Kassie Huerta is a 78 y.o. female.  1940      HPI  Pt presents for follow up from [facility] ER on 11/17/18 after fall. No LOC  She was diagnosed with rib fracture.  Taking aleve for pain.  Sleeping.  More sob today.  Cannot get same volume with incentive spirometry.  More confused this am  A little better now per  and daughter  Eating fair  The following have been reviewed and updated as appropriate in this visit:  Tobacco  Allergies  Meds  Problems       Review of Systems   Constitutional: Positive for fatigue. Negative for appetite change.   Respiratory: Positive for shortness of breath and wheezing. Negative for cough.    Cardiovascular: Positive for chest pain (over ribs).   Gastrointestinal: Negative for abdominal pain, diarrhea, nausea and vomiting.   Musculoskeletal:        Rib pain     Neurological: Negative for dizziness and headaches.   Psychiatric/Behavioral: Positive for confusion. Negative for sleep disturbance.       Objective     Vitals:    12/04/18 1650   BP: (!) 160/70   BP Location: Left upper arm   Patient Position: Sitting   Temp: 36.7 °C (98.1 °F)   TempSrc: Oral   SpO2: 92%   Weight: 47.6 kg (105 lb)     Body mass index is 21.21 kg/m².    Physical Exam   Constitutional: She appears well-developed and well-nourished.   Cardiovascular: Normal rate and regular rhythm.    No murmur heard.  Pulmonary/Chest: She has decreased breath sounds. She has no wheezes. She has no rhonchi.   Abdominal: Soft. There is no tenderness.   Musculoskeletal:   Tenderness over ribs midaxillary on left   Neurological:   Oriented currently   Psychiatric: She has a normal mood and affect.   Nursing note and vitals reviewed.      Assessment/Plan   Problem List Items Addressed This Visit     None      Visit Diagnoses     Confusion    -  Primary    concerned for infection    will check cxr  if symptoms increase to ER    Relevant Orders    X-RAY CHEST 2 VIEWS    SOB  (shortness of breath)        concerned for infection    will check cxr  if symptoms increase to ER    Relevant Orders    X-RAY CHEST 2 VIEWS    History of rib fracture        aleve prn  incentive spirometry    Relevant Orders    X-RAY CHEST 2 VIEWS                                                         Current Outpatient Prescriptions:   •  atenolol (TENORMIN) 25 mg tablet, Take 1 tablet by mouth., Disp: , Rfl:   •  buPROPion SR (WELLBUTRIN SR) 200 mg 12 hr tablet, Take 1 tablet (200 mg total) by mouth daily., Disp: , Rfl:   •  calcium citrate-vitamin D3 (CITRACAL) 200 mg calcium -250 unit tablet, Take by mouth daily., Disp: , Rfl:   •  carbidopa-levodopa (SINEMET CR)  mg per CR tablet, Take 1 tablet by mouth 4 (four) times a day., Disp: , Rfl:   •  cholecalciferol, vitamin D3, 1,000 unit tablet, Take 1,000 Units by mouth daily., Disp: , Rfl:   •  escitalopram (LEXAPRO) 20 mg tablet, Take 1 tablet (20 mg total) by mouth daily., Disp: , Rfl:   •  memantine (NAMENDA) 10 mg tablet, Take 1 tablet by mouth., Disp: , Rfl:   Return if symptoms worsen or fail to improve.  PVU

## 2018-12-17 NOTE — PROGRESS NOTES
NAME: Kassie Huerta    MRN: [id number]    YOB: 1940    EVENT DETAILS    Discharging Facility:  ([facility])  Date of Admission: 12/11/18  Date of Discharge: 12/14/18  Discharge Instructions Reviewed?: Yes         Reason for Admission:  Left pneumothorax      HPI: Spoke with pt and her . Pt admitted after a fall 2 weeks prior to admission. Pt says she tripped and fell on her left side. She often loses her balance due to her Parkinson's. She initially thought she was ok, but her left sided chest pain progressed and she became short of breath. In ED, pt was found to have a left pneumothorax and had a chest tube inserted. CT head negative. Pt was transferred to SNF for rehab. Chest tube was removed prior to discharge.     Pt denies chest pain, shortness of breath, lightheadedness, dizziness, redness/drainage from chest tube site, etc. Pt says she is feeling ok. Reviewed falls/home safety with pt. Pt wears slippers around the house. Encouraged pt to wear shoes with rubber soles.         MEDICATION REVIEW:    Reported by:: Patient  Prescriptions Filled?: No  If NO, please explain: No new medications  Was a medication discrepancy indentified?: No     Medication understanding?: Yes     Medication Adherence?: Yes     Any side effects from medication?: No       Medication review Reviewed, see medication history    Additional Comments:      FOLLOW-UP TESTS/PROCEDURES:    PCP: will have office assist with appt.   Dr. Gaviria ([facility]): needs to schedule      HOME MANAGEMENT    Living Arrangement: Spouse or Significant Other (Pt lives with her  in a 1 story house. Pt mostly independent in her care. Pt ambulates with a walker. )       HOME CARE SERVICES    Receiving Home Care Services: No              DURABLE MEDICAL EQUIPMENT    Durable Medical Equipment: Walker  Oxygen Use: No            BARRIERS TO CARE    SELF-CARE    Living Arrangement: Spouse or Significant Other (Pt lives with her  in a  1 story house. Pt mostly independent in her care. Pt ambulates with a walker. )       SOCIOECONOMIC    Financial Problems?: No     Transportation Issues?: No            INTERVENTION/CARE COORDINATION    Interventions/ Care Coordination: Encouraged patient to schedule with the PCP/Specialist, Addressed a knowledge deficit    PLAN OF CARE:    Reviewed signs/symptoms of worsening condition or complication that necessitate a call to the Physician's office.  Educated patient on access to care.  RN phone number given for future care management needs.       Bridgette Donis RN  [phone]

## 2018-12-24 NOTE — TELEPHONE ENCOUNTER
Please call and cancel [facility]   [phone]  And help patient get established with [facility] home care(order in chart)  Best contact is husbands cell phone [phone]

## 2018-12-24 NOTE — PROGRESS NOTES
Subjective      Patient ID: Kassie Huerta is a 78 y.o. female.  1940      [facility]  Facility/Provider transitioning from:[place] and [facility]  Was the patient treated and released from Er?No  Was the patient admitted to hospital? Yes 12/5/18  Date of discharge?12/11 from [place] transferred to [facility] 12/11 to 12/14  Was the patient contacted within 2 business days of discharge?Yes Reviewed TCM note  New meds?none  Discontinued meds?none  Pt went to ER 12/5 due to increasing SOB after fall with fractured rib  Found to have Pneumothorax in ER and had Chest tube placed  Breathing is at baseline using incentive spirometry  Eating fair, bowels are moving,  Feet are swelling especially right  Do not feel current home health agency is helpful  Walks with walker but is unsteady and losses her balance a lot  The following have been reviewed and updated as appropriate in this visit:  Allergies  Meds  Problems       Review of Systems   Constitutional: Positive for fatigue. Negative for activity change and appetite change (decreased).   Respiratory: Negative for cough, shortness of breath and wheezing.    Cardiovascular: Negative for chest pain, palpitations and leg swelling.   Gastrointestinal: Negative for abdominal distention, abdominal pain, constipation, diarrhea, nausea and vomiting.   Endocrine: Negative for cold intolerance and heat intolerance.   Genitourinary: Negative for difficulty urinating and frequency.   Musculoskeletal: Negative for back pain.   Skin: Negative for rash.   Neurological: Positive for weakness. Negative for dizziness, light-headedness and numbness.        Balance issues       Objective     Vitals:    12/24/18 1103 12/24/18 1123   BP: 128/64    BP Location: Left upper arm    Patient Position: Sitting    Pulse: 83 72   Temp: 36.7 °C (98.1 °F)    TempSrc: Oral    SpO2: 93% 95%   Weight: 47.2 kg (104 lb)      Body mass index is 21.01 kg/m².    Physical Exam   Constitutional:  She is oriented to person, place, and time.   Frail     HENT:   Right Ear: Tympanic membrane normal.   Left Ear: Tympanic membrane normal.   Mouth/Throat: Oropharynx is clear and moist and mucous membranes are normal.   Cardiovascular: Normal rate and regular rhythm.    No murmur heard.  Pulmonary/Chest: Effort normal. She has no wheezes.   Abdominal: Soft. There is no tenderness.   Musculoskeletal: She exhibits edema (+ 1 right ankle mild left ankle, no calf tenderness).   Neurological: She is alert and oriented to person, place, and time.   Psychiatric: She has a normal mood and affect.   Nursing note and vitals reviewed.      Assessment/Plan   Problem List Items Addressed This Visit     Parkinson's disease (CMS/HCC) ([facility])     Per Neuro  PT to help with balance           Other Visit Diagnoses     Hospital discharge follow-up    -  Primary    reviewed hospital records and TCM call    Traumatic pneumothorax, initial encounter        s/p chest tube,  breathing is at baseline    Balance disorder        PT to help with balance    Lower extremity edema        keep feet elevated        Will contact [facility] to take over    Current Outpatient Prescriptions:   •  atenolol (TENORMIN) 25 mg tablet, Take 1 tablet by mouth., Disp: , Rfl:   •  buPROPion SR (WELLBUTRIN SR) 200 mg 12 hr tablet, Take 1 tablet (200 mg total) by mouth daily., Disp: , Rfl:   •  calcium citrate-vitamin D3 (CITRACAL) 200 mg calcium -250 unit tablet, Take by mouth daily., Disp: , Rfl:   •  carbidopa-levodopa (SINEMET CR)  mg per CR tablet, Take 1 tablet by mouth 4 (four) times a day., Disp: , Rfl:   •  cholecalciferol, vitamin D3, 1,000 unit tablet, Take 1,000 Units by mouth daily., Disp: , Rfl:   •  escitalopram (LEXAPRO) 20 mg tablet, Take 1 tablet (20 mg total) by mouth daily., Disp: , Rfl:   •  memantine (NAMENDA) 10 mg tablet, Take 1 tablet by mouth., Disp: , Rfl:   Return for Next scheduled follow-up.  PVU

## 2018-12-26 NOTE — TELEPHONE ENCOUNTER
Spoke with Jag at [facility] and home care services were d/c. Spoke with Emerald at [facility] and provided intake info for pt to begin services under them. They will outreach to pts  in 48 hours to schedule visit. Pts , Filemon, notified.

## 2018-12-28 NOTE — TELEPHONE ENCOUNTER
May called from [facility]. Pt was opened to service today for PT and nursing.

## 2019-01-01 ENCOUNTER — TELEPHONE (OUTPATIENT)
Dept: FAMILY MEDICINE | Facility: CLINIC | Age: 79
End: 2019-01-01

## 2019-01-01 ENCOUNTER — OFFICE VISIT (OUTPATIENT)
Dept: FAMILY MEDICINE | Facility: CLINIC | Age: 79
End: 2019-01-01
Payer: MEDICARE

## 2019-01-01 VITALS
BODY MASS INDEX: 20.4 KG/M2 | SYSTOLIC BLOOD PRESSURE: 100 MMHG | DIASTOLIC BLOOD PRESSURE: 60 MMHG | WEIGHT: 101 LBS | TEMPERATURE: 97 F

## 2019-01-01 DIAGNOSIS — R53.83 OTHER FATIGUE: ICD-10-CM

## 2019-01-01 DIAGNOSIS — R63.0 POOR APPETITE: ICD-10-CM

## 2019-01-01 DIAGNOSIS — R53.1 WEAKNESS: ICD-10-CM

## 2019-01-01 DIAGNOSIS — G20.A1 PARKINSON'S DISEASE (CMS/HCC): Primary | ICD-10-CM

## 2019-01-01 DIAGNOSIS — R53.1 GENERALIZED WEAKNESS: ICD-10-CM

## 2019-01-01 DIAGNOSIS — G20.A1 PARKINSON DISEASE (CMS/HCC): Primary | ICD-10-CM

## 2019-01-01 DIAGNOSIS — R53.1 GENERAL WEAKNESS: ICD-10-CM

## 2019-01-01 PROCEDURE — 99213 OFFICE O/P EST LOW 20 MIN: CPT | Performed by: FAMILY MEDICINE

## 2019-01-01 ASSESSMENT — ENCOUNTER SYMPTOMS
ACTIVITY CHANGE: 0
DIZZINESS: 0
SHORTNESS OF BREATH: 0
ABDOMINAL DISTENTION: 0
WHEEZING: 0
ARTHRALGIAS: 0
FATIGUE: 1
CONSTIPATION: 0
PALPITATIONS: 0
DIARRHEA: 0
FREQUENCY: 0
ABDOMINAL PAIN: 0
NUMBNESS: 0
APPETITE CHANGE: 1
LIGHT-HEADEDNESS: 0
DIFFICULTY URINATING: 0
VOMITING: 0
WEAKNESS: 0
NAUSEA: 0
COUGH: 0

## 2019-02-20 NOTE — PROGRESS NOTES
Subjective      Patient ID: Kassie Huerta is a 78 y.o. female.  1940      HPI  Has appt with Neuro  Not eating much no appetite.  So weak she cant even do home exercises  Sleep all the time.   and daughter are seeing a significant decline and are questioning hospice or palliative care  No services at home currently  Incontinent of bowel and bladder  The following have been reviewed and updated as appropriate in this visit:  Allergies  Meds  Problems       Review of Systems   Constitutional: Positive for appetite change (barely eating) and fatigue (sleeps all the time). Negative for activity change.   Respiratory: Negative for cough, shortness of breath and wheezing.    Cardiovascular: Negative for chest pain, palpitations and leg swelling.   Gastrointestinal: Negative for abdominal distention, abdominal pain, constipation, diarrhea, nausea and vomiting.   Endocrine: Negative for cold intolerance and heat intolerance.   Genitourinary: Negative for difficulty urinating and frequency.        Incontinent of bowel and bladder     Musculoskeletal: Negative for arthralgias.        Unable to stand or walk unassisted by another person   Skin: Negative for rash.   Neurological: Negative for dizziness, weakness, light-headedness and numbness.       Objective     Vitals:    02/20/19 1338   BP: 100/60   BP Location: Left upper arm   Patient Position: Sitting   Temp: 36.1 °C (97 °F)   TempSrc: Oral   Weight: 45.8 kg (101 lb)     Body mass index is 20.4 kg/m².    Physical Exam   Constitutional:   Sleepy but arousable  Frail appearing   Cardiovascular: Normal rate and regular rhythm.    Pulmonary/Chest: Effort normal. No respiratory distress.   Abdominal: Soft. There is no tenderness.   Neurological:   Sleeping but able to be aroused     Psychiatric:   Tired weak voice   Nursing note and vitals reviewed.      Assessment/Plan   Diagnoses and all orders for this visit:    Parkinson's disease (CMS/HCC) ([facility])  (Primary)  Assessment & Plan:  Declining rapidly   Will refer for palliative care eval    Orders:  -     Ambulatory referral to Home and Palliative Care; Future    Poor appetite  Comments:  palliative care eval  Orders:  -     Ambulatory referral to Home and Palliative Care; Future    Generalized weakness  Comments:  palliative care eval  Orders:  -     Ambulatory referral to Home and Palliative Care; Future    Other fatigue  Comments:  palliative care eval  Orders:  -     Ambulatory referral to Home and Palliative Care; Future   and daughter are agreeable with plan

## 2019-02-26 NOTE — TELEPHONE ENCOUNTER
Spoke with Suha at [facility]. She is currently with pt.  would like to think further about signing pt to hospice. Home care feels pt would greatly benefit from hospice support services. She is requesting that you contact pts spouse directly since he would like your input. Home care needs orders for hospice  faxed to them [phone]. They will put orders on hold in the event pt/family agree to services.

## 2019-02-26 NOTE — TELEPHONE ENCOUNTER
Pt admitted to [facility] on Fri. Since initial visit pt has declined. pts feet/hands cold to touch, lung sounds diminished with periods of apnea. BP 70/40, R 16, O2 97%, HR 57. Pt  agrees to sign pt to hospice at this time. Ok to provide home care with verbal order?  Suha - [phone]

## 2019-02-27 NOTE — TELEPHONE ENCOUNTER
Suha from [facility] called to let you know patient was put on hospice yesterday. Suha went to see her today and patients  stopped all meds due to patient not eating. Suha wants to make sure this is ok.  is continuing the eye drops due to pressure and pain.  Suha [phone]